# Patient Record
Sex: MALE | Race: WHITE | NOT HISPANIC OR LATINO | Employment: OTHER
[De-identification: names, ages, dates, MRNs, and addresses within clinical notes are randomized per-mention and may not be internally consistent; named-entity substitution may affect disease eponyms.]

---

## 2020-07-30 ENCOUNTER — TRANSCRIBE ORDERS (OUTPATIENT)
Dept: OTHER | Age: 80
End: 2020-07-30

## 2020-07-30 DIAGNOSIS — Q27.30 AVM (ARTERIOVENOUS MALFORMATION): Primary | ICD-10-CM

## 2020-08-04 ENCOUNTER — TELEPHONE (OUTPATIENT)
Dept: RADIOLOGY | Facility: CLINIC | Age: 80
End: 2020-08-04

## 2020-08-04 NOTE — TELEPHONE ENCOUNTER
I called daughter Maria Elena's phone number, inquiring about referral for [facility].  She stated she told Dr Lawrence's office she was not driving down to the [facility].  I thanked her and clarified I would close out this referral as not needed.  LISETTE Manzo, RN, BSN  Interventional Radiology Nurse Coordinator   Phone:  [phone]

## 2022-01-01 ENCOUNTER — APPOINTMENT (OUTPATIENT)
Dept: SPEECH THERAPY | Facility: CLINIC | Age: 82
DRG: 154 | End: 2022-01-01
Payer: COMMERCIAL

## 2022-01-01 ENCOUNTER — DOCUMENTATION ONLY (OUTPATIENT)
Dept: OTOLARYNGOLOGY | Facility: CLINIC | Age: 82
End: 2022-01-01

## 2022-01-01 ENCOUNTER — APPOINTMENT (OUTPATIENT)
Dept: GENERAL RADIOLOGY | Facility: CLINIC | Age: 82
DRG: 154 | End: 2022-01-01
Payer: COMMERCIAL

## 2022-01-01 ENCOUNTER — APPOINTMENT (OUTPATIENT)
Dept: PHYSICAL THERAPY | Facility: CLINIC | Age: 82
DRG: 154 | End: 2022-01-01
Payer: COMMERCIAL

## 2022-01-01 ENCOUNTER — APPOINTMENT (OUTPATIENT)
Dept: OCCUPATIONAL THERAPY | Facility: CLINIC | Age: 82
DRG: 154 | End: 2022-01-01
Payer: COMMERCIAL

## 2022-01-01 ENCOUNTER — APPOINTMENT (OUTPATIENT)
Dept: CT IMAGING | Facility: CLINIC | Age: 82
DRG: 154 | End: 2022-01-01
Payer: COMMERCIAL

## 2022-01-01 ENCOUNTER — ANESTHESIA (OUTPATIENT)
Dept: INTENSIVE CARE | Facility: CLINIC | Age: 82
DRG: 154 | End: 2022-01-01
Payer: COMMERCIAL

## 2022-01-01 ENCOUNTER — TELEPHONE (OUTPATIENT)
Dept: OTOLARYNGOLOGY | Facility: CLINIC | Age: 82
End: 2022-01-01

## 2022-01-01 ENCOUNTER — PATIENT OUTREACH (OUTPATIENT)
Dept: CARE COORDINATION | Facility: CLINIC | Age: 82
End: 2022-01-01

## 2022-01-01 ENCOUNTER — ANESTHESIA EVENT (OUTPATIENT)
Dept: INTENSIVE CARE | Facility: CLINIC | Age: 82
DRG: 154 | End: 2022-01-01
Payer: COMMERCIAL

## 2022-01-01 ENCOUNTER — HOSPITAL ENCOUNTER (INPATIENT)
Facility: CLINIC | Age: 82
LOS: 18 days | Discharge: NURSING, REHAB OR LONG-TERM CARE | DRG: 154 | End: 2022-10-27
Admitting: SURGERY
Payer: COMMERCIAL

## 2022-01-01 ENCOUNTER — APPOINTMENT (OUTPATIENT)
Dept: ULTRASOUND IMAGING | Facility: CLINIC | Age: 82
DRG: 154 | End: 2022-01-01
Payer: COMMERCIAL

## 2022-01-01 VITALS
TEMPERATURE: 97.5 F | SYSTOLIC BLOOD PRESSURE: 109 MMHG | DIASTOLIC BLOOD PRESSURE: 58 MMHG | BODY MASS INDEX: 34.17 KG/M2 | HEIGHT: 71 IN | HEART RATE: 71 BPM | RESPIRATION RATE: 18 BRPM | OXYGEN SATURATION: 96 % | WEIGHT: 244.05 LBS

## 2022-01-01 DIAGNOSIS — E04.9 GOITER: ICD-10-CM

## 2022-01-01 DIAGNOSIS — J38.3 VOCAL CORD GRANULOMA: Primary | ICD-10-CM

## 2022-01-01 DIAGNOSIS — R06.81 APNEA: ICD-10-CM

## 2022-01-01 DIAGNOSIS — G47.33 OSA (OBSTRUCTIVE SLEEP APNEA): ICD-10-CM

## 2022-01-01 LAB
ABO/RH(D): NORMAL
ABO/RH(D): NORMAL
ALBUMIN SERPL BCG-MCNC: 3 G/DL (ref 3.5–5.2)
ALBUMIN SERPL BCG-MCNC: 3.3 G/DL (ref 3.5–5.2)
ALP SERPL-CCNC: 101 U/L (ref 40–129)
ALP SERPL-CCNC: 90 U/L (ref 40–129)
ALT SERPL W P-5'-P-CCNC: 9 U/L (ref 10–50)
ALT SERPL W P-5'-P-CCNC: <5 U/L (ref 10–50)
AMYLASE SERPL-CCNC: 62 U/L (ref 28–100)
ANION GAP SERPL CALCULATED.3IONS-SCNC: 10 MMOL/L (ref 7–15)
ANION GAP SERPL CALCULATED.3IONS-SCNC: 7 MMOL/L (ref 7–15)
ANION GAP SERPL CALCULATED.3IONS-SCNC: 8 MMOL/L (ref 7–15)
ANION GAP SERPL CALCULATED.3IONS-SCNC: 8 MMOL/L (ref 7–15)
ANION GAP SERPL CALCULATED.3IONS-SCNC: 9 MMOL/L (ref 7–15)
ANTIBODY SCREEN: NEGATIVE
APTT PPP: 33 SECONDS (ref 22–38)
APTT PPP: 35 SECONDS (ref 22–38)
AST SERPL W P-5'-P-CCNC: 17 U/L (ref 10–50)
AST SERPL W P-5'-P-CCNC: 18 U/L (ref 10–50)
ATRIAL RATE - MUSE: 56 BPM
ATRIAL RATE - MUSE: 75 BPM
BACTERIA SPT CULT: NORMAL
BACTERIA SPT CULT: NORMAL
BASE EXCESS BLDA CALC-SCNC: 7.8 MMOL/L (ref -9–1.8)
BASE EXCESS BLDV CALC-SCNC: 0.8 MMOL/L (ref -7.7–1.9)
BASE EXCESS BLDV CALC-SCNC: 2.6 MMOL/L (ref -7.7–1.9)
BASE EXCESS BLDV CALC-SCNC: 2.8 MMOL/L (ref -7.7–1.9)
BASE EXCESS BLDV CALC-SCNC: 3.9 MMOL/L (ref -7.7–1.9)
BASE EXCESS BLDV CALC-SCNC: 6.1 MMOL/L (ref -7.7–1.9)
BASE EXCESS BLDV CALC-SCNC: 6.1 MMOL/L (ref -7.7–1.9)
BASE EXCESS BLDV CALC-SCNC: 6.2 MMOL/L (ref -7.7–1.9)
BASOPHILS # BLD AUTO: 0 10E3/UL (ref 0–0.2)
BASOPHILS NFR BLD AUTO: 0 %
BILIRUB SERPL-MCNC: 0.4 MG/DL
BILIRUB SERPL-MCNC: 0.5 MG/DL
BUN SERPL-MCNC: 13.2 MG/DL (ref 8–23)
BUN SERPL-MCNC: 13.7 MG/DL (ref 8–23)
BUN SERPL-MCNC: 15.9 MG/DL (ref 8–23)
BUN SERPL-MCNC: 19.4 MG/DL (ref 8–23)
BUN SERPL-MCNC: 22.8 MG/DL (ref 8–23)
BUN SERPL-MCNC: 23.1 MG/DL (ref 8–23)
BUN SERPL-MCNC: 24.7 MG/DL (ref 8–23)
CA-I BLD-MCNC: 4.5 MG/DL (ref 4.4–5.2)
CALCIUM SERPL-MCNC: 7.9 MG/DL (ref 8.8–10.2)
CALCIUM SERPL-MCNC: 7.9 MG/DL (ref 8.8–10.2)
CALCIUM SERPL-MCNC: 8.2 MG/DL (ref 8.8–10.2)
CALCIUM SERPL-MCNC: 8.3 MG/DL (ref 8.8–10.2)
CALCIUM SERPL-MCNC: 8.3 MG/DL (ref 8.8–10.2)
CALCIUM SERPL-MCNC: 8.4 MG/DL (ref 8.8–10.2)
CALCIUM SERPL-MCNC: 8.7 MG/DL (ref 8.8–10.2)
CHLORIDE SERPL-SCNC: 100 MMOL/L (ref 98–107)
CHLORIDE SERPL-SCNC: 103 MMOL/L (ref 98–107)
CHLORIDE SERPL-SCNC: 104 MMOL/L (ref 98–107)
CHLORIDE SERPL-SCNC: 105 MMOL/L (ref 98–107)
CHLORIDE SERPL-SCNC: 107 MMOL/L (ref 98–107)
CREAT SERPL-MCNC: 1.03 MG/DL (ref 0.67–1.17)
CREAT SERPL-MCNC: 1.16 MG/DL (ref 0.67–1.17)
CREAT SERPL-MCNC: 1.18 MG/DL (ref 0.67–1.17)
CREAT SERPL-MCNC: 1.21 MG/DL (ref 0.67–1.17)
CREAT SERPL-MCNC: 1.22 MG/DL (ref 0.67–1.17)
CREAT SERPL-MCNC: 1.28 MG/DL (ref 0.67–1.17)
CREAT SERPL-MCNC: 1.33 MG/DL (ref 0.67–1.17)
DEPRECATED HCO3 PLAS-SCNC: 22 MMOL/L (ref 22–29)
DEPRECATED HCO3 PLAS-SCNC: 25 MMOL/L (ref 22–29)
DEPRECATED HCO3 PLAS-SCNC: 25 MMOL/L (ref 22–29)
DEPRECATED HCO3 PLAS-SCNC: 26 MMOL/L (ref 22–29)
DEPRECATED HCO3 PLAS-SCNC: 30 MMOL/L (ref 22–29)
DIASTOLIC BLOOD PRESSURE - MUSE: NORMAL MMHG
DIASTOLIC BLOOD PRESSURE - MUSE: NORMAL MMHG
EOSINOPHIL # BLD AUTO: 0 10E3/UL (ref 0–0.7)
EOSINOPHIL # BLD AUTO: 0 10E3/UL (ref 0–0.7)
EOSINOPHIL # BLD AUTO: 0.4 10E3/UL (ref 0–0.7)
EOSINOPHIL NFR BLD AUTO: 0 %
EOSINOPHIL NFR BLD AUTO: 0 %
EOSINOPHIL NFR BLD AUTO: 3 %
ERYTHROCYTE [DISTWIDTH] IN BLOOD BY AUTOMATED COUNT: 17.2 % (ref 10–15)
ERYTHROCYTE [DISTWIDTH] IN BLOOD BY AUTOMATED COUNT: 17.3 % (ref 10–15)
ERYTHROCYTE [DISTWIDTH] IN BLOOD BY AUTOMATED COUNT: 17.3 % (ref 10–15)
ERYTHROCYTE [DISTWIDTH] IN BLOOD BY AUTOMATED COUNT: 17.4 % (ref 10–15)
ERYTHROCYTE [DISTWIDTH] IN BLOOD BY AUTOMATED COUNT: 17.4 % (ref 10–15)
ERYTHROCYTE [DISTWIDTH] IN BLOOD BY AUTOMATED COUNT: 17.5 % (ref 10–15)
ERYTHROCYTE [DISTWIDTH] IN BLOOD BY AUTOMATED COUNT: 17.5 % (ref 10–15)
ERYTHROCYTE [DISTWIDTH] IN BLOOD BY AUTOMATED COUNT: 17.6 % (ref 10–15)
ERYTHROCYTE [DISTWIDTH] IN BLOOD BY AUTOMATED COUNT: 17.8 % (ref 10–15)
ERYTHROCYTE [DISTWIDTH] IN BLOOD BY AUTOMATED COUNT: 17.8 % (ref 10–15)
ERYTHROCYTE [DISTWIDTH] IN BLOOD BY AUTOMATED COUNT: 17.9 % (ref 10–15)
FIBRINOGEN PPP-MCNC: 624 MG/DL (ref 170–490)
FIBRINOGEN PPP-MCNC: 713 MG/DL (ref 170–490)
GFR SERPL CREATININE-BSD FRML MDRD: 54 ML/MIN/1.73M2
GFR SERPL CREATININE-BSD FRML MDRD: 56 ML/MIN/1.73M2
GFR SERPL CREATININE-BSD FRML MDRD: 60 ML/MIN/1.73M2
GFR SERPL CREATININE-BSD FRML MDRD: 60 ML/MIN/1.73M2
GFR SERPL CREATININE-BSD FRML MDRD: 62 ML/MIN/1.73M2
GFR SERPL CREATININE-BSD FRML MDRD: 63 ML/MIN/1.73M2
GFR SERPL CREATININE-BSD FRML MDRD: 73 ML/MIN/1.73M2
GLUCOSE BLDC GLUCOMTR-MCNC: 100 MG/DL (ref 70–99)
GLUCOSE BLDC GLUCOMTR-MCNC: 101 MG/DL (ref 70–99)
GLUCOSE BLDC GLUCOMTR-MCNC: 103 MG/DL (ref 70–99)
GLUCOSE BLDC GLUCOMTR-MCNC: 104 MG/DL (ref 70–99)
GLUCOSE BLDC GLUCOMTR-MCNC: 107 MG/DL (ref 70–99)
GLUCOSE BLDC GLUCOMTR-MCNC: 110 MG/DL (ref 70–99)
GLUCOSE BLDC GLUCOMTR-MCNC: 111 MG/DL (ref 70–99)
GLUCOSE BLDC GLUCOMTR-MCNC: 115 MG/DL (ref 70–99)
GLUCOSE BLDC GLUCOMTR-MCNC: 117 MG/DL (ref 70–99)
GLUCOSE BLDC GLUCOMTR-MCNC: 117 MG/DL (ref 70–99)
GLUCOSE BLDC GLUCOMTR-MCNC: 118 MG/DL (ref 70–99)
GLUCOSE BLDC GLUCOMTR-MCNC: 119 MG/DL (ref 70–99)
GLUCOSE BLDC GLUCOMTR-MCNC: 121 MG/DL (ref 70–99)
GLUCOSE BLDC GLUCOMTR-MCNC: 123 MG/DL (ref 70–99)
GLUCOSE BLDC GLUCOMTR-MCNC: 123 MG/DL (ref 70–99)
GLUCOSE BLDC GLUCOMTR-MCNC: 125 MG/DL (ref 70–99)
GLUCOSE BLDC GLUCOMTR-MCNC: 127 MG/DL (ref 70–99)
GLUCOSE BLDC GLUCOMTR-MCNC: 128 MG/DL (ref 70–99)
GLUCOSE BLDC GLUCOMTR-MCNC: 128 MG/DL (ref 70–99)
GLUCOSE BLDC GLUCOMTR-MCNC: 129 MG/DL (ref 70–99)
GLUCOSE BLDC GLUCOMTR-MCNC: 130 MG/DL (ref 70–99)
GLUCOSE BLDC GLUCOMTR-MCNC: 131 MG/DL (ref 70–99)
GLUCOSE BLDC GLUCOMTR-MCNC: 132 MG/DL (ref 70–99)
GLUCOSE BLDC GLUCOMTR-MCNC: 137 MG/DL (ref 70–99)
GLUCOSE BLDC GLUCOMTR-MCNC: 137 MG/DL (ref 70–99)
GLUCOSE BLDC GLUCOMTR-MCNC: 139 MG/DL (ref 70–99)
GLUCOSE BLDC GLUCOMTR-MCNC: 139 MG/DL (ref 70–99)
GLUCOSE BLDC GLUCOMTR-MCNC: 140 MG/DL (ref 70–99)
GLUCOSE BLDC GLUCOMTR-MCNC: 141 MG/DL (ref 70–99)
GLUCOSE BLDC GLUCOMTR-MCNC: 142 MG/DL (ref 70–99)
GLUCOSE BLDC GLUCOMTR-MCNC: 144 MG/DL (ref 70–99)
GLUCOSE BLDC GLUCOMTR-MCNC: 147 MG/DL (ref 70–99)
GLUCOSE BLDC GLUCOMTR-MCNC: 150 MG/DL (ref 70–99)
GLUCOSE BLDC GLUCOMTR-MCNC: 151 MG/DL (ref 70–99)
GLUCOSE BLDC GLUCOMTR-MCNC: 151 MG/DL (ref 70–99)
GLUCOSE BLDC GLUCOMTR-MCNC: 152 MG/DL (ref 70–99)
GLUCOSE BLDC GLUCOMTR-MCNC: 153 MG/DL (ref 70–99)
GLUCOSE BLDC GLUCOMTR-MCNC: 157 MG/DL (ref 70–99)
GLUCOSE BLDC GLUCOMTR-MCNC: 158 MG/DL (ref 70–99)
GLUCOSE BLDC GLUCOMTR-MCNC: 159 MG/DL (ref 70–99)
GLUCOSE BLDC GLUCOMTR-MCNC: 159 MG/DL (ref 70–99)
GLUCOSE BLDC GLUCOMTR-MCNC: 163 MG/DL (ref 70–99)
GLUCOSE BLDC GLUCOMTR-MCNC: 163 MG/DL (ref 70–99)
GLUCOSE BLDC GLUCOMTR-MCNC: 164 MG/DL (ref 70–99)
GLUCOSE BLDC GLUCOMTR-MCNC: 181 MG/DL (ref 70–99)
GLUCOSE BLDC GLUCOMTR-MCNC: 188 MG/DL (ref 70–99)
GLUCOSE BLDC GLUCOMTR-MCNC: 189 MG/DL (ref 70–99)
GLUCOSE BLDC GLUCOMTR-MCNC: 191 MG/DL (ref 70–99)
GLUCOSE BLDC GLUCOMTR-MCNC: 192 MG/DL (ref 70–99)
GLUCOSE BLDC GLUCOMTR-MCNC: 209 MG/DL (ref 70–99)
GLUCOSE BLDC GLUCOMTR-MCNC: 82 MG/DL (ref 70–99)
GLUCOSE BLDC GLUCOMTR-MCNC: 83 MG/DL (ref 70–99)
GLUCOSE BLDC GLUCOMTR-MCNC: 89 MG/DL (ref 70–99)
GLUCOSE BLDC GLUCOMTR-MCNC: 90 MG/DL (ref 70–99)
GLUCOSE BLDC GLUCOMTR-MCNC: 90 MG/DL (ref 70–99)
GLUCOSE BLDC GLUCOMTR-MCNC: 92 MG/DL (ref 70–99)
GLUCOSE BLDC GLUCOMTR-MCNC: 96 MG/DL (ref 70–99)
GLUCOSE BLDC GLUCOMTR-MCNC: 98 MG/DL (ref 70–99)
GLUCOSE BLDC GLUCOMTR-MCNC: 99 MG/DL (ref 70–99)
GLUCOSE SERPL-MCNC: 125 MG/DL (ref 70–99)
GLUCOSE SERPL-MCNC: 128 MG/DL (ref 70–99)
GLUCOSE SERPL-MCNC: 133 MG/DL (ref 70–99)
GLUCOSE SERPL-MCNC: 144 MG/DL (ref 70–99)
GLUCOSE SERPL-MCNC: 150 MG/DL (ref 70–99)
GLUCOSE SERPL-MCNC: 156 MG/DL (ref 70–99)
GLUCOSE SERPL-MCNC: 74 MG/DL (ref 70–99)
GRAM STAIN RESULT: NORMAL
HCO3 BLD-SCNC: 32 MMOL/L (ref 21–28)
HCO3 BLDV-SCNC: 27 MMOL/L (ref 21–28)
HCO3 BLDV-SCNC: 30 MMOL/L (ref 21–28)
HCO3 BLDV-SCNC: 31 MMOL/L (ref 21–28)
HCO3 BLDV-SCNC: 33 MMOL/L (ref 21–28)
HCT VFR BLD AUTO: 32.1 % (ref 40–53)
HCT VFR BLD AUTO: 32.3 % (ref 40–53)
HCT VFR BLD AUTO: 36.3 % (ref 40–53)
HCT VFR BLD AUTO: 36.7 % (ref 40–53)
HCT VFR BLD AUTO: 37.2 % (ref 40–53)
HCT VFR BLD AUTO: 37.9 % (ref 40–53)
HCT VFR BLD AUTO: 38 % (ref 40–53)
HCT VFR BLD AUTO: 38.1 % (ref 40–53)
HCT VFR BLD AUTO: 38.6 % (ref 40–53)
HCT VFR BLD AUTO: 38.9 % (ref 40–53)
HCT VFR BLD AUTO: 39 % (ref 40–53)
HCT VFR BLD AUTO: 39.7 % (ref 40–53)
HCT VFR BLD AUTO: 40.3 % (ref 40–53)
HGB BLD-MCNC: 10 G/DL (ref 13.3–17.7)
HGB BLD-MCNC: 10.9 G/DL (ref 13.3–17.7)
HGB BLD-MCNC: 11 G/DL (ref 13.3–17.7)
HGB BLD-MCNC: 11.2 G/DL (ref 13.3–17.7)
HGB BLD-MCNC: 11.2 G/DL (ref 13.3–17.7)
HGB BLD-MCNC: 11.4 G/DL (ref 13.3–17.7)
HGB BLD-MCNC: 11.5 G/DL (ref 13.3–17.7)
HGB BLD-MCNC: 11.6 G/DL (ref 13.3–17.7)
HGB BLD-MCNC: 11.9 G/DL (ref 13.3–17.7)
HGB BLD-MCNC: 12.1 G/DL (ref 13.3–17.7)
HGB BLD-MCNC: 9.9 G/DL (ref 13.3–17.7)
HOLD SPECIMEN: NORMAL
IMM GRANULOCYTES # BLD: 0 10E3/UL
IMM GRANULOCYTES # BLD: 0.1 10E3/UL
IMM GRANULOCYTES # BLD: 0.1 10E3/UL
IMM GRANULOCYTES NFR BLD: 0 %
IMM GRANULOCYTES NFR BLD: 1 %
IMM GRANULOCYTES NFR BLD: 1 %
INR PPP: 1.23 (ref 0.85–1.15)
INR PPP: 1.45 (ref 0.85–1.15)
INTERPRETATION ECG - MUSE: NORMAL
INTERPRETATION ECG - MUSE: NORMAL
LACTATE SERPL-SCNC: 1.8 MMOL/L (ref 0.7–2)
LACTATE SERPL-SCNC: 2.5 MMOL/L (ref 0.7–2)
LACTATE SERPL-SCNC: 3.3 MMOL/L (ref 0.7–2)
LIPASE SERPL-CCNC: 35 U/L (ref 13–60)
LYMPHOCYTES # BLD AUTO: 0.4 10E3/UL (ref 0.8–5.3)
LYMPHOCYTES # BLD AUTO: 0.6 10E3/UL (ref 0.8–5.3)
LYMPHOCYTES # BLD AUTO: 0.9 10E3/UL (ref 0.8–5.3)
LYMPHOCYTES NFR BLD AUTO: 5 %
LYMPHOCYTES NFR BLD AUTO: 6 %
LYMPHOCYTES NFR BLD AUTO: 8 %
MAGNESIUM SERPL-MCNC: 1.9 MG/DL (ref 1.7–2.3)
MAGNESIUM SERPL-MCNC: 2 MG/DL (ref 1.7–2.3)
MAGNESIUM SERPL-MCNC: 2 MG/DL (ref 1.7–2.3)
MAGNESIUM SERPL-MCNC: 2.1 MG/DL (ref 1.7–2.3)
MAGNESIUM SERPL-MCNC: 2.2 MG/DL (ref 1.7–2.3)
MAGNESIUM SERPL-MCNC: 2.3 MG/DL (ref 1.7–2.3)
MCH RBC QN AUTO: 23.9 PG (ref 26.5–33)
MCH RBC QN AUTO: 24 PG (ref 26.5–33)
MCH RBC QN AUTO: 24 PG (ref 26.5–33)
MCH RBC QN AUTO: 24.1 PG (ref 26.5–33)
MCH RBC QN AUTO: 24.1 PG (ref 26.5–33)
MCH RBC QN AUTO: 24.2 PG (ref 26.5–33)
MCH RBC QN AUTO: 24.4 PG (ref 26.5–33)
MCH RBC QN AUTO: 24.9 PG (ref 26.5–33)
MCHC RBC AUTO-ENTMCNC: 29.5 G/DL (ref 31.5–36.5)
MCHC RBC AUTO-ENTMCNC: 29.5 G/DL (ref 31.5–36.5)
MCHC RBC AUTO-ENTMCNC: 29.6 G/DL (ref 31.5–36.5)
MCHC RBC AUTO-ENTMCNC: 29.7 G/DL (ref 31.5–36.5)
MCHC RBC AUTO-ENTMCNC: 29.9 G/DL (ref 31.5–36.5)
MCHC RBC AUTO-ENTMCNC: 30 G/DL (ref 31.5–36.5)
MCHC RBC AUTO-ENTMCNC: 30.1 G/DL (ref 31.5–36.5)
MCHC RBC AUTO-ENTMCNC: 30.1 G/DL (ref 31.5–36.5)
MCHC RBC AUTO-ENTMCNC: 30.7 G/DL (ref 31.5–36.5)
MCHC RBC AUTO-ENTMCNC: 31.2 G/DL (ref 31.5–36.5)
MCV RBC AUTO: 78 FL (ref 78–100)
MCV RBC AUTO: 79 FL (ref 78–100)
MCV RBC AUTO: 80 FL (ref 78–100)
MCV RBC AUTO: 80 FL (ref 78–100)
MCV RBC AUTO: 81 FL (ref 78–100)
MCV RBC AUTO: 82 FL (ref 78–100)
MCV RBC AUTO: 83 FL (ref 78–100)
MONOCYTES # BLD AUTO: 0 10E3/UL (ref 0–1.3)
MONOCYTES # BLD AUTO: 0.3 10E3/UL (ref 0–1.3)
MONOCYTES # BLD AUTO: 0.8 10E3/UL (ref 0–1.3)
MONOCYTES NFR BLD AUTO: 1 %
MONOCYTES NFR BLD AUTO: 3 %
MONOCYTES NFR BLD AUTO: 7 %
MRSA DNA SPEC QL NAA+PROBE: NEGATIVE
NEUTROPHILS # BLD AUTO: 7.8 10E3/UL (ref 1.6–8.3)
NEUTROPHILS # BLD AUTO: 8.3 10E3/UL (ref 1.6–8.3)
NEUTROPHILS # BLD AUTO: 8.9 10E3/UL (ref 1.6–8.3)
NEUTROPHILS NFR BLD AUTO: 81 %
NEUTROPHILS NFR BLD AUTO: 90 %
NEUTROPHILS NFR BLD AUTO: 94 %
NRBC # BLD AUTO: 0 10E3/UL
NRBC BLD AUTO-RTO: 0 /100
O2/TOTAL GAS SETTING VFR VENT: 2 %
O2/TOTAL GAS SETTING VFR VENT: 21 %
O2/TOTAL GAS SETTING VFR VENT: 21 %
O2/TOTAL GAS SETTING VFR VENT: 40 %
O2/TOTAL GAS SETTING VFR VENT: 50 %
O2/TOTAL GAS SETTING VFR VENT: 50 %
OXYHGB MFR BLDV: 29 % (ref 70–75)
P AXIS - MUSE: 40 DEGREES
P AXIS - MUSE: 86 DEGREES
PCO2 BLD: 41 MM HG (ref 35–45)
PCO2 BLDV: 39 MM HG (ref 40–50)
PCO2 BLDV: 40 MM HG (ref 40–50)
PCO2 BLDV: 47 MM HG (ref 40–50)
PCO2 BLDV: 50 MM HG (ref 40–50)
PCO2 BLDV: 54 MM HG (ref 40–50)
PCO2 BLDV: 58 MM HG (ref 40–50)
PCO2 BLDV: 71 MM HG (ref 40–50)
PH BLD: 7.5 [PH] (ref 7.35–7.45)
PH BLDV: 7.24 [PH] (ref 7.32–7.43)
PH BLDV: 7.32 [PH] (ref 7.32–7.43)
PH BLDV: 7.38 [PH] (ref 7.32–7.43)
PH BLDV: 7.39 [PH] (ref 7.32–7.43)
PH BLDV: 7.43 [PH] (ref 7.32–7.43)
PH BLDV: 7.44 [PH] (ref 7.32–7.43)
PH BLDV: 7.49 [PH] (ref 7.32–7.43)
PHOSPHATE SERPL-MCNC: 2.4 MG/DL (ref 2.5–4.5)
PHOSPHATE SERPL-MCNC: 2.6 MG/DL (ref 2.5–4.5)
PHOSPHATE SERPL-MCNC: 2.6 MG/DL (ref 2.5–4.5)
PHOSPHATE SERPL-MCNC: 2.7 MG/DL (ref 2.5–4.5)
PHOSPHATE SERPL-MCNC: 2.8 MG/DL (ref 2.5–4.5)
PHOSPHATE SERPL-MCNC: 3 MG/DL (ref 2.5–4.5)
PHOSPHATE SERPL-MCNC: 3 MG/DL (ref 2.5–4.5)
PHOSPHATE SERPL-MCNC: 3.2 MG/DL (ref 2.5–4.5)
PHOSPHATE SERPL-MCNC: 3.4 MG/DL (ref 2.5–4.5)
PLATELET # BLD AUTO: 188 10E3/UL (ref 150–450)
PLATELET # BLD AUTO: 217 10E3/UL (ref 150–450)
PLATELET # BLD AUTO: 235 10E3/UL (ref 150–450)
PLATELET # BLD AUTO: 268 10E3/UL (ref 150–450)
PLATELET # BLD AUTO: 276 10E3/UL (ref 150–450)
PLATELET # BLD AUTO: 277 10E3/UL (ref 150–450)
PLATELET # BLD AUTO: 279 10E3/UL (ref 150–450)
PLATELET # BLD AUTO: 280 10E3/UL (ref 150–450)
PLATELET # BLD AUTO: 282 10E3/UL (ref 150–450)
PLATELET # BLD AUTO: 295 10E3/UL (ref 150–450)
PLATELET # BLD AUTO: 302 10E3/UL (ref 150–450)
PLATELET # BLD AUTO: 320 10E3/UL (ref 150–450)
PLATELET # BLD AUTO: 323 10E3/UL (ref 150–450)
PO2 BLD: 77 MM HG (ref 80–105)
PO2 BLDV: 21 MM HG (ref 25–47)
PO2 BLDV: 39 MM HG (ref 25–47)
PO2 BLDV: 41 MM HG (ref 25–47)
PO2 BLDV: 50 MM HG (ref 25–47)
PO2 BLDV: 56 MM HG (ref 25–47)
PO2 BLDV: 57 MM HG (ref 25–47)
PO2 BLDV: 64 MM HG (ref 25–47)
POTASSIUM SERPL-SCNC: 3.6 MMOL/L (ref 3.4–5.3)
POTASSIUM SERPL-SCNC: 3.6 MMOL/L (ref 3.4–5.3)
POTASSIUM SERPL-SCNC: 4 MMOL/L (ref 3.4–5.3)
POTASSIUM SERPL-SCNC: 4.2 MMOL/L (ref 3.4–5.3)
POTASSIUM SERPL-SCNC: 4.3 MMOL/L (ref 3.4–5.3)
POTASSIUM SERPL-SCNC: 4.4 MMOL/L (ref 3.4–5.3)
POTASSIUM SERPL-SCNC: 4.4 MMOL/L (ref 3.4–5.3)
POTASSIUM SERPL-SCNC: 4.5 MMOL/L (ref 3.4–5.3)
POTASSIUM SERPL-SCNC: 4.6 MMOL/L (ref 3.4–5.3)
PR INTERVAL - MUSE: 168 MS
PR INTERVAL - MUSE: 206 MS
PROT SERPL-MCNC: 6.8 G/DL (ref 6.4–8.3)
PROT SERPL-MCNC: 6.9 G/DL (ref 6.4–8.3)
QRS DURATION - MUSE: 106 MS
QRS DURATION - MUSE: 108 MS
QT - MUSE: 430 MS
QT - MUSE: 462 MS
QTC - MUSE: 414 MS
QTC - MUSE: 515 MS
R AXIS - MUSE: -46 DEGREES
R AXIS - MUSE: -56 DEGREES
RBC # BLD AUTO: 4.09 10E6/UL (ref 4.4–5.9)
RBC # BLD AUTO: 4.13 10E6/UL (ref 4.4–5.9)
RBC # BLD AUTO: 4.46 10E6/UL (ref 4.4–5.9)
RBC # BLD AUTO: 4.55 10E6/UL (ref 4.4–5.9)
RBC # BLD AUTO: 4.65 10E6/UL (ref 4.4–5.9)
RBC # BLD AUTO: 4.65 10E6/UL (ref 4.4–5.9)
RBC # BLD AUTO: 4.68 10E6/UL (ref 4.4–5.9)
RBC # BLD AUTO: 4.71 10E6/UL (ref 4.4–5.9)
RBC # BLD AUTO: 4.72 10E6/UL (ref 4.4–5.9)
RBC # BLD AUTO: 4.8 10E6/UL (ref 4.4–5.9)
RBC # BLD AUTO: 4.85 10E6/UL (ref 4.4–5.9)
RBC # BLD AUTO: 4.86 10E6/UL (ref 4.4–5.9)
RBC # BLD AUTO: 4.87 10E6/UL (ref 4.4–5.9)
SA TARGET DNA: NEGATIVE
SARS-COV-2 RNA RESP QL NAA+PROBE: NEGATIVE
SODIUM SERPL-SCNC: 134 MMOL/L (ref 136–145)
SODIUM SERPL-SCNC: 136 MMOL/L (ref 136–145)
SODIUM SERPL-SCNC: 136 MMOL/L (ref 136–145)
SODIUM SERPL-SCNC: 137 MMOL/L (ref 136–145)
SODIUM SERPL-SCNC: 138 MMOL/L (ref 136–145)
SODIUM SERPL-SCNC: 139 MMOL/L (ref 136–145)
SODIUM SERPL-SCNC: 141 MMOL/L (ref 136–145)
SPECIMEN EXPIRATION DATE: NORMAL
SPECIMEN EXPIRATION DATE: NORMAL
SYSTOLIC BLOOD PRESSURE - MUSE: NORMAL MMHG
SYSTOLIC BLOOD PRESSURE - MUSE: NORMAL MMHG
T AXIS - MUSE: -35 DEGREES
T AXIS - MUSE: -7 DEGREES
T4 FREE SERPL-MCNC: 1.07 NG/DL (ref 0.9–1.7)
TSH SERPL DL<=0.005 MIU/L-ACNC: 0.28 UIU/ML (ref 0.3–4.2)
VENTRICULAR RATE- MUSE: 56 BPM
VENTRICULAR RATE- MUSE: 75 BPM
WBC # BLD AUTO: 10.3 10E3/UL (ref 4–11)
WBC # BLD AUTO: 10.5 10E3/UL (ref 4–11)
WBC # BLD AUTO: 10.8 10E3/UL (ref 4–11)
WBC # BLD AUTO: 11.1 10E3/UL (ref 4–11)
WBC # BLD AUTO: 11.5 10E3/UL (ref 4–11)
WBC # BLD AUTO: 13.3 10E3/UL (ref 4–11)
WBC # BLD AUTO: 8.3 10E3/UL (ref 4–11)
WBC # BLD AUTO: 8.9 10E3/UL (ref 4–11)
WBC # BLD AUTO: 9.5 10E3/UL (ref 4–11)
WBC # BLD AUTO: 9.8 10E3/UL (ref 4–11)

## 2022-01-01 PROCEDURE — 250N000013 HC RX MED GY IP 250 OP 250 PS 637: Performed by: PHYSICIAN ASSISTANT

## 2022-01-01 PROCEDURE — 83735 ASSAY OF MAGNESIUM: CPT

## 2022-01-01 PROCEDURE — 250N000013 HC RX MED GY IP 250 OP 250 PS 637: Performed by: OTOLARYNGOLOGY

## 2022-01-01 PROCEDURE — 85027 COMPLETE CBC AUTOMATED: CPT | Performed by: OTOLARYNGOLOGY

## 2022-01-01 PROCEDURE — G0463 HOSPITAL OUTPT CLINIC VISIT: HCPCS

## 2022-01-01 PROCEDURE — 85025 COMPLETE CBC W/AUTO DIFF WBC: CPT | Performed by: SURGERY

## 2022-01-01 PROCEDURE — 82310 ASSAY OF CALCIUM: CPT

## 2022-01-01 PROCEDURE — 36415 COLL VENOUS BLD VENIPUNCTURE: CPT | Performed by: STUDENT IN AN ORGANIZED HEALTH CARE EDUCATION/TRAINING PROGRAM

## 2022-01-01 PROCEDURE — 97161 PT EVAL LOW COMPLEX 20 MIN: CPT | Mod: GP

## 2022-01-01 PROCEDURE — 84100 ASSAY OF PHOSPHORUS: CPT

## 2022-01-01 PROCEDURE — 97530 THERAPEUTIC ACTIVITIES: CPT | Mod: GO

## 2022-01-01 PROCEDURE — 80048 BASIC METABOLIC PNL TOTAL CA: CPT

## 2022-01-01 PROCEDURE — 82803 BLOOD GASES ANY COMBINATION: CPT

## 2022-01-01 PROCEDURE — 74018 RADEX ABDOMEN 1 VIEW: CPT | Mod: 26 | Performed by: RADIOLOGY

## 2022-01-01 PROCEDURE — 200N000002 HC R&B ICU UMMC

## 2022-01-01 PROCEDURE — 97535 SELF CARE MNGMENT TRAINING: CPT | Mod: GO

## 2022-01-01 PROCEDURE — 92610 EVALUATE SWALLOWING FUNCTION: CPT | Mod: GN | Performed by: SPEECH-LANGUAGE PATHOLOGIST

## 2022-01-01 PROCEDURE — 36415 COLL VENOUS BLD VENIPUNCTURE: CPT | Performed by: OTOLARYNGOLOGY

## 2022-01-01 PROCEDURE — 999N000253 HC STATISTIC WEANING TRIALS

## 2022-01-01 PROCEDURE — 93010 ELECTROCARDIOGRAM REPORT: CPT | Performed by: INTERNAL MEDICINE

## 2022-01-01 PROCEDURE — 85730 THROMBOPLASTIN TIME PARTIAL: CPT

## 2022-01-01 PROCEDURE — 82805 BLOOD GASES W/O2 SATURATION: CPT

## 2022-01-01 PROCEDURE — 97535 SELF CARE MNGMENT TRAINING: CPT | Mod: GO | Performed by: OCCUPATIONAL THERAPIST

## 2022-01-01 PROCEDURE — 99291 CRITICAL CARE FIRST HOUR: CPT | Mod: GC | Performed by: SURGERY

## 2022-01-01 PROCEDURE — 250N000013 HC RX MED GY IP 250 OP 250 PS 637

## 2022-01-01 PROCEDURE — 36415 COLL VENOUS BLD VENIPUNCTURE: CPT | Performed by: SURGERY

## 2022-01-01 PROCEDURE — 258N000003 HC RX IP 258 OP 636: Performed by: STUDENT IN AN ORGANIZED HEALTH CARE EDUCATION/TRAINING PROGRAM

## 2022-01-01 PROCEDURE — 71045 X-RAY EXAM CHEST 1 VIEW: CPT | Mod: 26 | Performed by: RADIOLOGY

## 2022-01-01 PROCEDURE — 94640 AIRWAY INHALATION TREATMENT: CPT

## 2022-01-01 PROCEDURE — 82803 BLOOD GASES ANY COMBINATION: CPT | Performed by: SURGERY

## 2022-01-01 PROCEDURE — 999N000157 HC STATISTIC RCP TIME EA 10 MIN

## 2022-01-01 PROCEDURE — 85027 COMPLETE CBC AUTOMATED: CPT

## 2022-01-01 PROCEDURE — 70491 CT SOFT TISSUE NECK W/DYE: CPT

## 2022-01-01 PROCEDURE — 94002 VENT MGMT INPAT INIT DAY: CPT

## 2022-01-01 PROCEDURE — 250N000011 HC RX IP 250 OP 636

## 2022-01-01 PROCEDURE — 250N000009 HC RX 250: Performed by: NURSE PRACTITIONER

## 2022-01-01 PROCEDURE — 250N000011 HC RX IP 250 OP 636: Performed by: STUDENT IN AN ORGANIZED HEALTH CARE EDUCATION/TRAINING PROGRAM

## 2022-01-01 PROCEDURE — C9113 INJ PANTOPRAZOLE SODIUM, VIA: HCPCS

## 2022-01-01 PROCEDURE — 97530 THERAPEUTIC ACTIVITIES: CPT | Mod: GO | Performed by: OCCUPATIONAL THERAPIST

## 2022-01-01 PROCEDURE — 82330 ASSAY OF CALCIUM: CPT

## 2022-01-01 PROCEDURE — 86850 RBC ANTIBODY SCREEN: CPT

## 2022-01-01 PROCEDURE — 36569 INSJ PICC 5 YR+ W/O IMAGING: CPT

## 2022-01-01 PROCEDURE — 92526 ORAL FUNCTION THERAPY: CPT | Mod: GN | Performed by: SPEECH-LANGUAGE PATHOLOGIST

## 2022-01-01 PROCEDURE — 31575 DIAGNOSTIC LARYNGOSCOPY: CPT | Performed by: PHYSICIAN ASSISTANT

## 2022-01-01 PROCEDURE — 5A1945Z RESPIRATORY VENTILATION, 24-96 CONSECUTIVE HOURS: ICD-10-PCS | Performed by: SURGERY

## 2022-01-01 PROCEDURE — 5A1945Z RESPIRATORY VENTILATION, 24-96 CONSECUTIVE HOURS: ICD-10-PCS | Performed by: OTOLARYNGOLOGY

## 2022-01-01 PROCEDURE — 85018 HEMOGLOBIN: CPT | Performed by: OTOLARYNGOLOGY

## 2022-01-01 PROCEDURE — 36415 COLL VENOUS BLD VENIPUNCTURE: CPT

## 2022-01-01 PROCEDURE — 258N000003 HC RX IP 258 OP 636

## 2022-01-01 PROCEDURE — 80053 COMPREHEN METABOLIC PANEL: CPT

## 2022-01-01 PROCEDURE — 94003 VENT MGMT INPAT SUBQ DAY: CPT

## 2022-01-01 PROCEDURE — 87641 MR-STAPH DNA AMP PROBE: CPT | Performed by: STUDENT IN AN ORGANIZED HEALTH CARE EDUCATION/TRAINING PROGRAM

## 2022-01-01 PROCEDURE — 87205 SMEAR GRAM STAIN: CPT

## 2022-01-01 PROCEDURE — 999N000007 HC SITE CHECK

## 2022-01-01 PROCEDURE — 999N000128 HC STATISTIC PERIPHERAL IV START W/O US GUIDANCE

## 2022-01-01 PROCEDURE — 83605 ASSAY OF LACTIC ACID: CPT | Performed by: STUDENT IN AN ORGANIZED HEALTH CARE EDUCATION/TRAINING PROGRAM

## 2022-01-01 PROCEDURE — 999N000065 XR CHEST PORT 1 VIEW

## 2022-01-01 PROCEDURE — 120N000002 HC R&B MED SURG/OB UMMC

## 2022-01-01 PROCEDURE — 97165 OT EVAL LOW COMPLEX 30 MIN: CPT | Mod: GO

## 2022-01-01 PROCEDURE — 97530 THERAPEUTIC ACTIVITIES: CPT | Mod: GP

## 2022-01-01 PROCEDURE — 999N000127 HC STATISTIC PERIPHERAL IV START W US GUIDANCE

## 2022-01-01 PROCEDURE — 250N000011 HC RX IP 250 OP 636: Performed by: SURGERY

## 2022-01-01 PROCEDURE — 0CJS8ZZ INSPECTION OF LARYNX, VIA NATURAL OR ARTIFICIAL OPENING ENDOSCOPIC: ICD-10-PCS | Performed by: OTOLARYNGOLOGY

## 2022-01-01 PROCEDURE — 120N000003 HC R&B IMCU UMMC

## 2022-01-01 PROCEDURE — 85384 FIBRINOGEN ACTIVITY: CPT

## 2022-01-01 PROCEDURE — 250N000012 HC RX MED GY IP 250 OP 636 PS 637

## 2022-01-01 PROCEDURE — 250N000009 HC RX 250

## 2022-01-01 PROCEDURE — 97530 THERAPEUTIC ACTIVITIES: CPT | Mod: GP | Performed by: REHABILITATION PRACTITIONER

## 2022-01-01 PROCEDURE — 85610 PROTHROMBIN TIME: CPT | Performed by: OTOLARYNGOLOGY

## 2022-01-01 PROCEDURE — 250N000013 HC RX MED GY IP 250 OP 250 PS 637: Performed by: STUDENT IN AN ORGANIZED HEALTH CARE EDUCATION/TRAINING PROGRAM

## 2022-01-01 PROCEDURE — U0005 INFEC AGEN DETEC AMPLI PROBE: HCPCS | Performed by: STUDENT IN AN ORGANIZED HEALTH CARE EDUCATION/TRAINING PROGRAM

## 2022-01-01 PROCEDURE — 999N000259 HC STATISTIC EXTUBATION

## 2022-01-01 PROCEDURE — 85041 AUTOMATED RBC COUNT: CPT | Performed by: OTOLARYNGOLOGY

## 2022-01-01 PROCEDURE — 99291 CRITICAL CARE FIRST HOUR: CPT

## 2022-01-01 PROCEDURE — 93005 ELECTROCARDIOGRAM TRACING: CPT

## 2022-01-01 PROCEDURE — 84132 ASSAY OF SERUM POTASSIUM: CPT | Performed by: OTOLARYNGOLOGY

## 2022-01-01 PROCEDURE — 999N000111 HC STATISTIC OT IP EVAL DEFER: Performed by: OCCUPATIONAL THERAPIST

## 2022-01-01 PROCEDURE — 76536 US EXAM OF HEAD AND NECK: CPT

## 2022-01-01 PROCEDURE — 999N000065 XR ABDOMEN PORT 1 VIEW

## 2022-01-01 PROCEDURE — 370N000003 HC ANESTHESIA WARD SERVICE

## 2022-01-01 PROCEDURE — 97116 GAIT TRAINING THERAPY: CPT | Mod: GP | Performed by: REHABILITATION PRACTITIONER

## 2022-01-01 PROCEDURE — A7035 POS AIRWAY PRESS HEADGEAR: HCPCS

## 2022-01-01 PROCEDURE — 92526 ORAL FUNCTION THERAPY: CPT | Mod: GN

## 2022-01-01 PROCEDURE — 97116 GAIT TRAINING THERAPY: CPT | Mod: GP

## 2022-01-01 PROCEDURE — 80048 BASIC METABOLIC PNL TOTAL CA: CPT | Performed by: SURGERY

## 2022-01-01 PROCEDURE — 83605 ASSAY OF LACTIC ACID: CPT

## 2022-01-01 PROCEDURE — 99207 PR NO CHARGE LOS: CPT | Performed by: OTOLARYNGOLOGY

## 2022-01-01 PROCEDURE — 74018 RADEX ABDOMEN 1 VIEW: CPT

## 2022-01-01 PROCEDURE — 85610 PROTHROMBIN TIME: CPT

## 2022-01-01 PROCEDURE — 84443 ASSAY THYROID STIM HORMONE: CPT

## 2022-01-01 PROCEDURE — 71260 CT THORAX DX C+: CPT | Mod: 26 | Performed by: RADIOLOGY

## 2022-01-01 PROCEDURE — 76536 US EXAM OF HEAD AND NECK: CPT | Mod: 26 | Performed by: RADIOLOGY

## 2022-01-01 PROCEDURE — 70491 CT SOFT TISSUE NECK W/DYE: CPT | Mod: 26 | Performed by: STUDENT IN AN ORGANIZED HEALTH CARE EDUCATION/TRAINING PROGRAM

## 2022-01-01 PROCEDURE — 83690 ASSAY OF LIPASE: CPT

## 2022-01-01 PROCEDURE — 99207 PR NO BILLABLE SERVICE THIS VISIT: CPT | Performed by: SURGERY

## 2022-01-01 PROCEDURE — 83735 ASSAY OF MAGNESIUM: CPT | Performed by: SURGERY

## 2022-01-01 PROCEDURE — 250N000009 HC RX 250: Performed by: STUDENT IN AN ORGANIZED HEALTH CARE EDUCATION/TRAINING PROGRAM

## 2022-01-01 PROCEDURE — 85384 FIBRINOGEN ACTIVITY: CPT | Performed by: OTOLARYNGOLOGY

## 2022-01-01 PROCEDURE — 71260 CT THORAX DX C+: CPT

## 2022-01-01 PROCEDURE — 71045 X-RAY EXAM CHEST 1 VIEW: CPT

## 2022-01-01 PROCEDURE — 84439 ASSAY OF FREE THYROXINE: CPT

## 2022-01-01 PROCEDURE — 999N000185 HC STATISTIC TRANSPORT TIME EA 15 MIN

## 2022-01-01 PROCEDURE — 258N000003 HC RX IP 258 OP 636: Performed by: SURGERY

## 2022-01-01 PROCEDURE — 86901 BLOOD TYPING SEROLOGIC RH(D): CPT

## 2022-01-01 PROCEDURE — 85004 AUTOMATED DIFF WBC COUNT: CPT | Performed by: SURGERY

## 2022-01-01 PROCEDURE — 84132 ASSAY OF SERUM POTASSIUM: CPT | Performed by: NURSE PRACTITIONER

## 2022-01-01 PROCEDURE — 36600 WITHDRAWAL OF ARTERIAL BLOOD: CPT

## 2022-01-01 PROCEDURE — 250N000011 HC RX IP 250 OP 636: Performed by: OTOLARYNGOLOGY

## 2022-01-01 PROCEDURE — 120N000005 HC R&B MS OVERFLOW UMMC

## 2022-01-01 PROCEDURE — 80053 COMPREHEN METABOLIC PANEL: CPT | Performed by: SURGERY

## 2022-01-01 PROCEDURE — 82150 ASSAY OF AMYLASE: CPT

## 2022-01-01 PROCEDURE — 84100 ASSAY OF PHOSPHORUS: CPT | Performed by: OTOLARYNGOLOGY

## 2022-01-01 RX ORDER — MULTIPLE VITAMINS W/ MINERALS TAB 9MG-400MCG
1 TAB ORAL DAILY
Status: DISCONTINUED | OUTPATIENT
Start: 2022-01-01 | End: 2022-01-01 | Stop reason: HOSPADM

## 2022-01-01 RX ORDER — POLYETHYLENE GLYCOL 3350 17 G/17G
17 POWDER, FOR SOLUTION ORAL DAILY
Status: DISCONTINUED | OUTPATIENT
Start: 2022-01-01 | End: 2022-01-01

## 2022-01-01 RX ORDER — DEXMEDETOMIDINE HYDROCHLORIDE 4 UG/ML
.1-1.2 INJECTION, SOLUTION INTRAVENOUS CONTINUOUS
Status: DISCONTINUED | OUTPATIENT
Start: 2022-01-01 | End: 2022-01-01

## 2022-01-01 RX ORDER — PANTOPRAZOLE SODIUM 40 MG/1
40 TABLET, DELAYED RELEASE ORAL
Qty: 30 TABLET | Refills: 1 | Status: SHIPPED | OUTPATIENT
Start: 2022-01-01 | End: 2022-01-01

## 2022-01-01 RX ORDER — DEXAMETHASONE SODIUM PHOSPHATE 4 MG/ML
8 INJECTION, SOLUTION INTRA-ARTICULAR; INTRALESIONAL; INTRAMUSCULAR; INTRAVENOUS; SOFT TISSUE EVERY 8 HOURS
Status: COMPLETED | OUTPATIENT
Start: 2022-01-01 | End: 2022-01-01

## 2022-01-01 RX ORDER — FLUTICASONE PROPIONATE 220 UG/1
1 AEROSOL, METERED RESPIRATORY (INHALATION) EVERY 12 HOURS
Status: DISCONTINUED | OUTPATIENT
Start: 2022-01-01 | End: 2022-01-01 | Stop reason: HOSPADM

## 2022-01-01 RX ORDER — FUROSEMIDE 20 MG
20 TABLET ORAL EVERY MORNING
Status: DISCONTINUED | OUTPATIENT
Start: 2022-01-01 | End: 2022-01-01 | Stop reason: HOSPADM

## 2022-01-01 RX ORDER — POTASSIUM CHLORIDE 7.45 MG/ML
10 INJECTION INTRAVENOUS
Status: COMPLETED | OUTPATIENT
Start: 2022-01-01 | End: 2022-01-01

## 2022-01-01 RX ORDER — OXYCODONE HYDROCHLORIDE 5 MG/1
5 TABLET ORAL EVERY 6 HOURS PRN
Status: DISCONTINUED | OUTPATIENT
Start: 2022-01-01 | End: 2022-01-01

## 2022-01-01 RX ORDER — PROPOFOL 10 MG/ML
5-75 INJECTION, EMULSION INTRAVENOUS CONTINUOUS
Status: DISCONTINUED | OUTPATIENT
Start: 2022-01-01 | End: 2022-01-01

## 2022-01-01 RX ORDER — NALOXONE HYDROCHLORIDE 0.4 MG/ML
0.4 INJECTION, SOLUTION INTRAMUSCULAR; INTRAVENOUS; SUBCUTANEOUS
Status: DISCONTINUED | OUTPATIENT
Start: 2022-01-01 | End: 2022-01-01 | Stop reason: HOSPADM

## 2022-01-01 RX ORDER — AMOXICILLIN 250 MG
1 CAPSULE ORAL AT BEDTIME
Status: DISCONTINUED | OUTPATIENT
Start: 2022-01-01 | End: 2022-01-01

## 2022-01-01 RX ORDER — MULTIPLE VITAMINS W/ MINERALS TAB 9MG-400MCG
1 TAB ORAL DAILY
Status: DISCONTINUED | OUTPATIENT
Start: 2022-01-01 | End: 2022-01-01

## 2022-01-01 RX ORDER — SODIUM CHLORIDE, SODIUM LACTATE, POTASSIUM CHLORIDE, CALCIUM CHLORIDE 600; 310; 30; 20 MG/100ML; MG/100ML; MG/100ML; MG/100ML
INJECTION, SOLUTION INTRAVENOUS CONTINUOUS
Status: DISCONTINUED | OUTPATIENT
Start: 2022-01-01 | End: 2022-01-01

## 2022-01-01 RX ORDER — ACETAMINOPHEN 325 MG/1
650 TABLET ORAL EVERY 4 HOURS PRN
Status: DISCONTINUED | OUTPATIENT
Start: 2022-01-01 | End: 2022-01-01 | Stop reason: HOSPADM

## 2022-01-01 RX ORDER — NALOXONE HYDROCHLORIDE 0.4 MG/ML
0.2 INJECTION, SOLUTION INTRAMUSCULAR; INTRAVENOUS; SUBCUTANEOUS
Status: DISCONTINUED | OUTPATIENT
Start: 2022-01-01 | End: 2022-01-01 | Stop reason: HOSPADM

## 2022-01-01 RX ORDER — ATORVASTATIN CALCIUM 40 MG/1
40 TABLET, FILM COATED ORAL AT BEDTIME
Status: DISCONTINUED | OUTPATIENT
Start: 2022-01-01 | End: 2022-01-01 | Stop reason: HOSPADM

## 2022-01-01 RX ORDER — DEXTROSE MONOHYDRATE 100 MG/ML
INJECTION, SOLUTION INTRAVENOUS CONTINUOUS PRN
Status: DISCONTINUED | OUTPATIENT
Start: 2022-01-01 | End: 2022-01-01 | Stop reason: HOSPADM

## 2022-01-01 RX ORDER — HYDROMORPHONE HCL IN WATER/PF 6 MG/30 ML
.1-.4 PATIENT CONTROLLED ANALGESIA SYRINGE INTRAVENOUS
Status: DISCONTINUED | OUTPATIENT
Start: 2022-01-01 | End: 2022-01-01

## 2022-01-01 RX ORDER — ETOMIDATE 2 MG/ML
INJECTION INTRAVENOUS
Status: DISCONTINUED | OUTPATIENT
Start: 2022-01-01 | End: 2022-01-01

## 2022-01-01 RX ORDER — POLYETHYLENE GLYCOL 3350 17 G/17G
17 POWDER, FOR SOLUTION ORAL 2 TIMES DAILY PRN
Status: DISCONTINUED | OUTPATIENT
Start: 2022-01-01 | End: 2022-01-01

## 2022-01-01 RX ORDER — AMOXICILLIN 250 MG
1 CAPSULE ORAL
Status: DISCONTINUED | OUTPATIENT
Start: 2022-01-01 | End: 2022-01-01

## 2022-01-01 RX ORDER — ONDANSETRON 4 MG/1
4 TABLET, ORALLY DISINTEGRATING ORAL EVERY 6 HOURS PRN
Status: DISCONTINUED | OUTPATIENT
Start: 2022-01-01 | End: 2022-01-01 | Stop reason: HOSPADM

## 2022-01-01 RX ORDER — MULTIPLE VITAMINS W/ MINERALS TAB 9MG-400MCG
1 TAB ORAL DAILY
Qty: 30 TABLET | Refills: 0 | Status: SHIPPED | OUTPATIENT
Start: 2022-01-01

## 2022-01-01 RX ORDER — DEXAMETHASONE SODIUM PHOSPHATE 4 MG/ML
8 INJECTION, SOLUTION INTRA-ARTICULAR; INTRALESIONAL; INTRAMUSCULAR; INTRAVENOUS; SOFT TISSUE EVERY 8 HOURS
Status: DISCONTINUED | OUTPATIENT
Start: 2022-01-01 | End: 2022-01-01

## 2022-01-01 RX ORDER — IOPAMIDOL 755 MG/ML
100 INJECTION, SOLUTION INTRAVASCULAR ONCE
Status: COMPLETED | OUTPATIENT
Start: 2022-01-01 | End: 2022-01-01

## 2022-01-01 RX ORDER — FUROSEMIDE 20 MG
20 TABLET ORAL EVERY MORNING
COMMUNITY

## 2022-01-01 RX ORDER — DEXTROSE MONOHYDRATE 25 G/50ML
25-50 INJECTION, SOLUTION INTRAVENOUS
Status: DISCONTINUED | OUTPATIENT
Start: 2022-01-01 | End: 2022-01-01

## 2022-01-01 RX ORDER — PANTOPRAZOLE SODIUM 40 MG/1
40 TABLET, DELAYED RELEASE ORAL
Status: DISCONTINUED | OUTPATIENT
Start: 2022-01-01 | End: 2022-01-01

## 2022-01-01 RX ORDER — LIDOCAINE 40 MG/G
CREAM TOPICAL
Status: DISCONTINUED | OUTPATIENT
Start: 2022-01-01 | End: 2022-01-01 | Stop reason: HOSPADM

## 2022-01-01 RX ORDER — MULTIPLE VITAMINS W/ MINERALS TAB 9MG-400MCG
1 TAB ORAL DAILY
Qty: 30 TABLET | Refills: 0 | Status: SHIPPED | OUTPATIENT
Start: 2022-01-01 | End: 2022-01-01

## 2022-01-01 RX ORDER — PIPERACILLIN SODIUM, TAZOBACTAM SODIUM 3; .375 G/15ML; G/15ML
3.38 INJECTION, POWDER, LYOPHILIZED, FOR SOLUTION INTRAVENOUS EVERY 6 HOURS
Status: DISCONTINUED | OUTPATIENT
Start: 2022-01-01 | End: 2022-01-01

## 2022-01-01 RX ORDER — HEPARIN SODIUM 5000 [USP'U]/.5ML
5000 INJECTION, SOLUTION INTRAVENOUS; SUBCUTANEOUS EVERY 8 HOURS
Status: DISCONTINUED | OUTPATIENT
Start: 2022-01-01 | End: 2022-01-01

## 2022-01-01 RX ORDER — ALBUTEROL SULFATE 90 UG/1
2 AEROSOL, METERED RESPIRATORY (INHALATION) EVERY 6 HOURS PRN
Status: DISCONTINUED | OUTPATIENT
Start: 2022-01-01 | End: 2022-01-01 | Stop reason: HOSPADM

## 2022-01-01 RX ORDER — POLYETHYLENE GLYCOL 3350 17 G/17G
17 POWDER, FOR SOLUTION ORAL 2 TIMES DAILY PRN
Status: DISCONTINUED | OUTPATIENT
Start: 2022-01-01 | End: 2022-01-01 | Stop reason: HOSPADM

## 2022-01-01 RX ORDER — FLUTICASONE PROPIONATE 220 UG/1
1 AEROSOL, METERED RESPIRATORY (INHALATION) EVERY 12 HOURS
Qty: 12 G | Refills: 1 | Status: SHIPPED | OUTPATIENT
Start: 2022-01-01

## 2022-01-01 RX ORDER — ACETAMINOPHEN 325 MG/1
650 TABLET ORAL EVERY 4 HOURS PRN
Qty: 50 TABLET | Refills: 0 | Status: SHIPPED | OUTPATIENT
Start: 2022-01-01

## 2022-01-01 RX ORDER — FLUTICASONE PROPIONATE 44 UG/1
2 AEROSOL, METERED RESPIRATORY (INHALATION) EVERY 12 HOURS
Qty: 10.6 G | Refills: 3 | Status: SHIPPED | OUTPATIENT
Start: 2022-01-01 | End: 2022-01-01

## 2022-01-01 RX ORDER — HYDROMORPHONE HCL IN WATER/PF 6 MG/30 ML
.2-.5 PATIENT CONTROLLED ANALGESIA SYRINGE INTRAVENOUS
Status: DISCONTINUED | OUTPATIENT
Start: 2022-01-01 | End: 2022-01-01

## 2022-01-01 RX ORDER — NICOTINE POLACRILEX 4 MG
15-30 LOZENGE BUCCAL
Status: DISCONTINUED | OUTPATIENT
Start: 2022-01-01 | End: 2022-01-01 | Stop reason: HOSPADM

## 2022-01-01 RX ORDER — ACETAMINOPHEN 500 MG
500 TABLET ORAL 2 TIMES DAILY
Status: ON HOLD | COMMUNITY
End: 2022-01-01

## 2022-01-01 RX ORDER — DEXTROSE MONOHYDRATE 25 G/50ML
25-50 INJECTION, SOLUTION INTRAVENOUS
Status: DISCONTINUED | OUTPATIENT
Start: 2022-01-01 | End: 2022-01-01 | Stop reason: HOSPADM

## 2022-01-01 RX ORDER — CEFTRIAXONE 2 G/1
2 INJECTION, POWDER, FOR SOLUTION INTRAMUSCULAR; INTRAVENOUS EVERY 24 HOURS
Status: COMPLETED | OUTPATIENT
Start: 2022-01-01 | End: 2022-01-01

## 2022-01-01 RX ORDER — PANTOPRAZOLE SODIUM 40 MG/1
40 TABLET, DELAYED RELEASE ORAL
Qty: 30 TABLET | Refills: 1 | Status: SHIPPED | OUTPATIENT
Start: 2022-01-01

## 2022-01-01 RX ORDER — ACETAMINOPHEN 325 MG/1
650 TABLET ORAL EVERY 4 HOURS PRN
Status: DISCONTINUED | OUTPATIENT
Start: 2022-01-01 | End: 2022-01-01

## 2022-01-01 RX ORDER — FLUTICASONE PROPIONATE 44 UG/1
2 AEROSOL, METERED RESPIRATORY (INHALATION) EVERY 12 HOURS
Status: DISCONTINUED | OUTPATIENT
Start: 2022-01-01 | End: 2022-01-01

## 2022-01-01 RX ORDER — ACETAMINOPHEN 325 MG/1
650 TABLET ORAL EVERY 4 HOURS PRN
Qty: 50 TABLET | Refills: 0 | Status: SHIPPED | OUTPATIENT
Start: 2022-01-01 | End: 2022-01-01

## 2022-01-01 RX ORDER — GUAIFENESIN 600 MG/1
15 TABLET, EXTENDED RELEASE ORAL DAILY
Status: DISCONTINUED | OUTPATIENT
Start: 2022-01-01 | End: 2022-01-01

## 2022-01-01 RX ORDER — LACTOBACILLUS RHAMNOSUS GG 10B CELL
1 CAPSULE ORAL 2 TIMES DAILY
Status: DISCONTINUED | OUTPATIENT
Start: 2022-01-01 | End: 2022-01-01 | Stop reason: HOSPADM

## 2022-01-01 RX ORDER — FLUTICASONE PROPIONATE 220 UG/1
1 AEROSOL, METERED RESPIRATORY (INHALATION) EVERY 12 HOURS
Qty: 12 G | Refills: 1 | Status: SHIPPED | OUTPATIENT
Start: 2022-01-01 | End: 2022-01-01

## 2022-01-01 RX ORDER — NICOTINE POLACRILEX 4 MG
15-30 LOZENGE BUCCAL
Status: DISCONTINUED | OUTPATIENT
Start: 2022-01-01 | End: 2022-01-01

## 2022-01-01 RX ORDER — ONDANSETRON 2 MG/ML
4 INJECTION INTRAMUSCULAR; INTRAVENOUS EVERY 6 HOURS PRN
Status: DISCONTINUED | OUTPATIENT
Start: 2022-01-01 | End: 2022-01-01 | Stop reason: HOSPADM

## 2022-01-01 RX ORDER — PANTOPRAZOLE SODIUM 40 MG/1
40 TABLET, DELAYED RELEASE ORAL
Status: DISCONTINUED | OUTPATIENT
Start: 2022-01-01 | End: 2022-01-01 | Stop reason: HOSPADM

## 2022-01-01 RX ORDER — ATORVASTATIN CALCIUM 40 MG/1
40 TABLET, FILM COATED ORAL AT BEDTIME
COMMUNITY

## 2022-01-01 RX ORDER — ATORVASTATIN CALCIUM 40 MG/1
40 TABLET, FILM COATED ORAL AT BEDTIME
Status: DISCONTINUED | OUTPATIENT
Start: 2022-01-01 | End: 2022-01-01

## 2022-01-01 RX ORDER — LACTOBACILLUS RHAMNOSUS GG 10B CELL
1 CAPSULE ORAL 2 TIMES DAILY
Status: DISCONTINUED | OUTPATIENT
Start: 2022-01-01 | End: 2022-01-01

## 2022-01-01 RX ORDER — AMOXICILLIN 250 MG
1 CAPSULE ORAL
Status: DISCONTINUED | OUTPATIENT
Start: 2022-01-01 | End: 2022-01-01 | Stop reason: HOSPADM

## 2022-01-01 RX ADMIN — POTASSIUM CHLORIDE 10 MEQ: 7.46 INJECTION, SOLUTION INTRAVENOUS at 05:28

## 2022-01-01 RX ADMIN — PANTOPRAZOLE SODIUM 40 MG: 40 TABLET, DELAYED RELEASE ORAL at 08:07

## 2022-01-01 RX ADMIN — Medication 1 CAPSULE: at 07:53

## 2022-01-01 RX ADMIN — APIXABAN 5 MG: 5 TABLET, FILM COATED ORAL at 07:38

## 2022-01-01 RX ADMIN — FUROSEMIDE 20 MG: 20 TABLET ORAL at 08:28

## 2022-01-01 RX ADMIN — DEXAMETHASONE SODIUM PHOSPHATE 8 MG: 4 INJECTION, SOLUTION INTRA-ARTICULAR; INTRALESIONAL; INTRAMUSCULAR; INTRAVENOUS; SOFT TISSUE at 02:27

## 2022-01-01 RX ADMIN — PROPOFOL 15 MCG/KG/MIN: 10 INJECTION, EMULSION INTRAVENOUS at 05:11

## 2022-01-01 RX ADMIN — DEXAMETHASONE SODIUM PHOSPHATE 8 MG: 4 INJECTION, SOLUTION INTRA-ARTICULAR; INTRALESIONAL; INTRAMUSCULAR; INTRAVENOUS; SOFT TISSUE at 19:33

## 2022-01-01 RX ADMIN — INSULIN ASPART 1 UNITS: 100 INJECTION, SOLUTION INTRAVENOUS; SUBCUTANEOUS at 20:58

## 2022-01-01 RX ADMIN — APIXABAN 5 MG: 5 TABLET, FILM COATED ORAL at 09:17

## 2022-01-01 RX ADMIN — HEPARIN SODIUM 5000 UNITS: 5000 INJECTION, SOLUTION INTRAVENOUS; SUBCUTANEOUS at 17:29

## 2022-01-01 RX ADMIN — PROPOFOL 40 MCG/KG/MIN: 10 INJECTION, EMULSION INTRAVENOUS at 18:14

## 2022-01-01 RX ADMIN — Medication 1 CAPSULE: at 07:38

## 2022-01-01 RX ADMIN — DEXAMETHASONE SODIUM PHOSPHATE 8 MG: 4 INJECTION, SOLUTION INTRA-ARTICULAR; INTRALESIONAL; INTRAMUSCULAR; INTRAVENOUS; SOFT TISSUE at 18:22

## 2022-01-01 RX ADMIN — APIXABAN 5 MG: 5 TABLET, FILM COATED ORAL at 08:24

## 2022-01-01 RX ADMIN — INSULIN ASPART 1 UNITS: 100 INJECTION, SOLUTION INTRAVENOUS; SUBCUTANEOUS at 04:05

## 2022-01-01 RX ADMIN — PROPOFOL 20 MCG/KG/MIN: 10 INJECTION, EMULSION INTRAVENOUS at 18:36

## 2022-01-01 RX ADMIN — ATORVASTATIN CALCIUM 40 MG: 40 TABLET, FILM COATED ORAL at 20:47

## 2022-01-01 RX ADMIN — SODIUM CHLORIDE, POTASSIUM CHLORIDE, SODIUM LACTATE AND CALCIUM CHLORIDE: 600; 310; 30; 20 INJECTION, SOLUTION INTRAVENOUS at 12:04

## 2022-01-01 RX ADMIN — Medication 1 CAPSULE: at 08:09

## 2022-01-01 RX ADMIN — DEXAMETHASONE SODIUM PHOSPHATE 8 MG: 4 INJECTION, SOLUTION INTRA-ARTICULAR; INTRALESIONAL; INTRAMUSCULAR; INTRAVENOUS; SOFT TISSUE at 08:05

## 2022-01-01 RX ADMIN — APIXABAN 5 MG: 5 TABLET, FILM COATED ORAL at 07:59

## 2022-01-01 RX ADMIN — PROPOFOL 30 MCG/KG/MIN: 10 INJECTION, EMULSION INTRAVENOUS at 22:32

## 2022-01-01 RX ADMIN — FUROSEMIDE 20 MG: 20 TABLET ORAL at 08:08

## 2022-01-01 RX ADMIN — APIXABAN 5 MG: 5 TABLET, FILM COATED ORAL at 19:55

## 2022-01-01 RX ADMIN — FLUTICASONE PROPIONATE 1 PUFF: 220 AEROSOL, METERED RESPIRATORY (INHALATION) at 08:03

## 2022-01-01 RX ADMIN — PANTOPRAZOLE SODIUM 40 MG: 40 TABLET, DELAYED RELEASE ORAL at 07:39

## 2022-01-01 RX ADMIN — INSULIN ASPART 1 UNITS: 100 INJECTION, SOLUTION INTRAVENOUS; SUBCUTANEOUS at 23:59

## 2022-01-01 RX ADMIN — PIPERACILLIN AND TAZOBACTAM 3.38 G: 3; .375 INJECTION, POWDER, LYOPHILIZED, FOR SOLUTION INTRAVENOUS at 08:21

## 2022-01-01 RX ADMIN — Medication 1 CAPSULE: at 19:33

## 2022-01-01 RX ADMIN — Medication 1 CAPSULE: at 08:29

## 2022-01-01 RX ADMIN — Medication 1 CAPSULE: at 08:07

## 2022-01-01 RX ADMIN — PROPOFOL 10 MCG/KG/MIN: 10 INJECTION, EMULSION INTRAVENOUS at 11:00

## 2022-01-01 RX ADMIN — ATORVASTATIN CALCIUM 40 MG: 40 TABLET, FILM COATED ORAL at 20:19

## 2022-01-01 RX ADMIN — APIXABAN 5 MG: 5 TABLET, FILM COATED ORAL at 19:45

## 2022-01-01 RX ADMIN — NOREPINEPHRINE BITARTRATE 0.03 MCG/KG/MIN: 1 INJECTION, SOLUTION, CONCENTRATE INTRAVENOUS at 18:17

## 2022-01-01 RX ADMIN — FUROSEMIDE 20 MG: 20 TABLET ORAL at 07:46

## 2022-01-01 RX ADMIN — Medication 1 TABLET: at 08:08

## 2022-01-01 RX ADMIN — FUROSEMIDE 20 MG: 20 TABLET ORAL at 09:17

## 2022-01-01 RX ADMIN — DEXAMETHASONE SODIUM PHOSPHATE 8 MG: 4 INJECTION, SOLUTION INTRA-ARTICULAR; INTRALESIONAL; INTRAMUSCULAR; INTRAVENOUS; SOFT TISSUE at 08:50

## 2022-01-01 RX ADMIN — DEXAMETHASONE SODIUM PHOSPHATE 8 MG: 4 INJECTION, SOLUTION INTRA-ARTICULAR; INTRALESIONAL; INTRAMUSCULAR; INTRAVENOUS; SOFT TISSUE at 21:51

## 2022-01-01 RX ADMIN — APIXABAN 5 MG: 5 TABLET, FILM COATED ORAL at 19:57

## 2022-01-01 RX ADMIN — APIXABAN 5 MG: 5 TABLET, FILM COATED ORAL at 08:29

## 2022-01-01 RX ADMIN — INSULIN ASPART 1 UNITS: 100 INJECTION, SOLUTION INTRAVENOUS; SUBCUTANEOUS at 12:27

## 2022-01-01 RX ADMIN — PANTOPRAZOLE SODIUM 40 MG: 40 TABLET, DELAYED RELEASE ORAL at 08:50

## 2022-01-01 RX ADMIN — ATORVASTATIN CALCIUM 40 MG: 40 TABLET, FILM COATED ORAL at 20:09

## 2022-01-01 RX ADMIN — FLUTICASONE PROPIONATE 1 PUFF: 220 AEROSOL, METERED RESPIRATORY (INHALATION) at 01:19

## 2022-01-01 RX ADMIN — ATORVASTATIN CALCIUM 40 MG: 40 TABLET, FILM COATED ORAL at 20:28

## 2022-01-01 RX ADMIN — FUROSEMIDE 20 MG: 20 TABLET ORAL at 08:24

## 2022-01-01 RX ADMIN — ACETAMINOPHEN 650 MG: 325 TABLET, FILM COATED ORAL at 08:52

## 2022-01-01 RX ADMIN — FLUTICASONE PROPIONATE 1 PUFF: 220 AEROSOL, METERED RESPIRATORY (INHALATION) at 20:18

## 2022-01-01 RX ADMIN — Medication 1 CAPSULE: at 08:00

## 2022-01-01 RX ADMIN — ATORVASTATIN CALCIUM 40 MG: 40 TABLET, FILM COATED ORAL at 19:33

## 2022-01-01 RX ADMIN — Medication 1 CAPSULE: at 09:17

## 2022-01-01 RX ADMIN — Medication 1 TABLET: at 07:59

## 2022-01-01 RX ADMIN — INSULIN ASPART 1 UNITS: 100 INJECTION, SOLUTION INTRAVENOUS; SUBCUTANEOUS at 12:19

## 2022-01-01 RX ADMIN — APIXABAN 5 MG: 5 TABLET, FILM COATED ORAL at 08:09

## 2022-01-01 RX ADMIN — AMOXICILLIN AND CLAVULANATE POTASSIUM 1 TABLET: 875; 125 TABLET, FILM COATED ORAL at 07:46

## 2022-01-01 RX ADMIN — Medication 1 CAPSULE: at 20:09

## 2022-01-01 RX ADMIN — FLUTICASONE PROPIONATE 1 PUFF: 220 AEROSOL, METERED RESPIRATORY (INHALATION) at 07:57

## 2022-01-01 RX ADMIN — INSULIN ASPART 1 UNITS: 100 INJECTION, SOLUTION INTRAVENOUS; SUBCUTANEOUS at 08:21

## 2022-01-01 RX ADMIN — Medication 1 CAPSULE: at 20:19

## 2022-01-01 RX ADMIN — APIXABAN 5 MG: 5 TABLET, FILM COATED ORAL at 20:03

## 2022-01-01 RX ADMIN — APIXABAN 5 MG: 5 TABLET, FILM COATED ORAL at 08:12

## 2022-01-01 RX ADMIN — APIXABAN 5 MG: 5 TABLET, FILM COATED ORAL at 08:06

## 2022-01-01 RX ADMIN — ATORVASTATIN CALCIUM 40 MG: 40 TABLET, FILM COATED ORAL at 19:26

## 2022-01-01 RX ADMIN — FUROSEMIDE 20 MG: 20 TABLET ORAL at 09:10

## 2022-01-01 RX ADMIN — HEPARIN SODIUM 5000 UNITS: 5000 INJECTION, SOLUTION INTRAVENOUS; SUBCUTANEOUS at 08:51

## 2022-01-01 RX ADMIN — SODIUM CHLORIDE, POTASSIUM CHLORIDE, SODIUM LACTATE AND CALCIUM CHLORIDE: 600; 310; 30; 20 INJECTION, SOLUTION INTRAVENOUS at 04:53

## 2022-01-01 RX ADMIN — APIXABAN 5 MG: 5 TABLET, FILM COATED ORAL at 19:33

## 2022-01-01 RX ADMIN — Medication 1 CAPSULE: at 19:54

## 2022-01-01 RX ADMIN — Medication 40 MG: at 08:17

## 2022-01-01 RX ADMIN — INSULIN ASPART 1 UNITS: 100 INJECTION, SOLUTION INTRAVENOUS; SUBCUTANEOUS at 15:47

## 2022-01-01 RX ADMIN — Medication 1 CAPSULE: at 20:51

## 2022-01-01 RX ADMIN — APIXABAN 5 MG: 5 TABLET, FILM COATED ORAL at 20:28

## 2022-01-01 RX ADMIN — FUROSEMIDE 20 MG: 20 TABLET ORAL at 07:53

## 2022-01-01 RX ADMIN — APIXABAN 5 MG: 5 TABLET, FILM COATED ORAL at 07:46

## 2022-01-01 RX ADMIN — FUROSEMIDE 20 MG: 20 TABLET ORAL at 07:40

## 2022-01-01 RX ADMIN — APIXABAN 5 MG: 5 TABLET, FILM COATED ORAL at 08:17

## 2022-01-01 RX ADMIN — APIXABAN 5 MG: 5 TABLET, FILM COATED ORAL at 19:26

## 2022-01-01 RX ADMIN — APIXABAN 5 MG: 5 TABLET, FILM COATED ORAL at 19:30

## 2022-01-01 RX ADMIN — APIXABAN 5 MG: 5 TABLET, FILM COATED ORAL at 09:10

## 2022-01-01 RX ADMIN — FLUTICASONE PROPIONATE 1 PUFF: 220 AEROSOL, METERED RESPIRATORY (INHALATION) at 12:58

## 2022-01-01 RX ADMIN — FLUTICASONE PROPIONATE 1 PUFF: 220 AEROSOL, METERED RESPIRATORY (INHALATION) at 20:28

## 2022-01-01 RX ADMIN — Medication 1 TABLET: at 07:53

## 2022-01-01 RX ADMIN — APIXABAN 5 MG: 5 TABLET, FILM COATED ORAL at 20:19

## 2022-01-01 RX ADMIN — ATORVASTATIN CALCIUM 40 MG: 40 TABLET, FILM COATED ORAL at 21:50

## 2022-01-01 RX ADMIN — APIXABAN 5 MG: 5 TABLET, FILM COATED ORAL at 20:26

## 2022-01-01 RX ADMIN — HYDROMORPHONE HYDROCHLORIDE 0.4 MG: 0.2 INJECTION, SOLUTION INTRAMUSCULAR; INTRAVENOUS; SUBCUTANEOUS at 17:28

## 2022-01-01 RX ADMIN — DEXAMETHASONE SODIUM PHOSPHATE 8 MG: 4 INJECTION, SOLUTION INTRA-ARTICULAR; INTRALESIONAL; INTRAMUSCULAR; INTRAVENOUS; SOFT TISSUE at 11:31

## 2022-01-01 RX ADMIN — ATORVASTATIN CALCIUM 40 MG: 40 TABLET, FILM COATED ORAL at 19:54

## 2022-01-01 RX ADMIN — LIDOCAINE HYDROCHLORIDE 1 ML: 10 INJECTION, SOLUTION EPIDURAL; INFILTRATION; INTRACAUDAL; PERINEURAL at 16:57

## 2022-01-01 RX ADMIN — INSULIN ASPART 1 UNITS: 100 INJECTION, SOLUTION INTRAVENOUS; SUBCUTANEOUS at 08:26

## 2022-01-01 RX ADMIN — Medication 1 TABLET: at 08:07

## 2022-01-01 RX ADMIN — ROCURONIUM BROMIDE 100 MG: 10 INJECTION INTRAVENOUS at 15:09

## 2022-01-01 RX ADMIN — OXYCODONE HYDROCHLORIDE 5 MG: 5 TABLET ORAL at 10:36

## 2022-01-01 RX ADMIN — APIXABAN 5 MG: 5 TABLET, FILM COATED ORAL at 07:39

## 2022-01-01 RX ADMIN — INSULIN ASPART 1 UNITS: 100 INJECTION, SOLUTION INTRAVENOUS; SUBCUTANEOUS at 03:52

## 2022-01-01 RX ADMIN — Medication 1 CAPSULE: at 19:57

## 2022-01-01 RX ADMIN — APIXABAN 5 MG: 5 TABLET, FILM COATED ORAL at 07:53

## 2022-01-01 RX ADMIN — APIXABAN 5 MG: 5 TABLET, FILM COATED ORAL at 20:48

## 2022-01-01 RX ADMIN — HEPARIN SODIUM 5000 UNITS: 5000 INJECTION, SOLUTION INTRAVENOUS; SUBCUTANEOUS at 08:21

## 2022-01-01 RX ADMIN — Medication 1 TABLET: at 09:17

## 2022-01-01 RX ADMIN — ATORVASTATIN CALCIUM 40 MG: 40 TABLET, FILM COATED ORAL at 20:26

## 2022-01-01 RX ADMIN — FLUTICASONE PROPIONATE 2 PUFF: 44 AEROSOL, METERED RESPIRATORY (INHALATION) at 08:24

## 2022-01-01 RX ADMIN — MULTIVITAMIN 15 ML: LIQUID ORAL at 08:21

## 2022-01-01 RX ADMIN — PANTOPRAZOLE SODIUM 40 MG: 40 INJECTION, POWDER, FOR SOLUTION INTRAVENOUS at 07:53

## 2022-01-01 RX ADMIN — APIXABAN 5 MG: 5 TABLET, FILM COATED ORAL at 20:09

## 2022-01-01 RX ADMIN — AMOXICILLIN AND CLAVULANATE POTASSIUM 1 TABLET: 875; 125 TABLET, FILM COATED ORAL at 20:51

## 2022-01-01 RX ADMIN — PANTOPRAZOLE SODIUM 40 MG: 40 TABLET, DELAYED RELEASE ORAL at 08:05

## 2022-01-01 RX ADMIN — CEFTRIAXONE SODIUM 2 G: 2 INJECTION, POWDER, FOR SOLUTION INTRAMUSCULAR; INTRAVENOUS at 14:15

## 2022-01-01 RX ADMIN — Medication 1 CAPSULE: at 08:17

## 2022-01-01 RX ADMIN — Medication 0.3 MG/HR: at 05:03

## 2022-01-01 RX ADMIN — ATORVASTATIN CALCIUM 40 MG: 40 TABLET, FILM COATED ORAL at 19:57

## 2022-01-01 RX ADMIN — Medication 1 TABLET: at 07:58

## 2022-01-01 RX ADMIN — DEXAMETHASONE SODIUM PHOSPHATE 8 MG: 4 INJECTION, SOLUTION INTRA-ARTICULAR; INTRALESIONAL; INTRAMUSCULAR; INTRAVENOUS; SOFT TISSUE at 06:22

## 2022-01-01 RX ADMIN — FUROSEMIDE 20 MG: 20 TABLET ORAL at 08:50

## 2022-01-01 RX ADMIN — Medication 1 CAPSULE: at 20:47

## 2022-01-01 RX ADMIN — PIPERACILLIN AND TAZOBACTAM 3.38 G: 3; .375 INJECTION, POWDER, LYOPHILIZED, FOR SOLUTION INTRAVENOUS at 02:12

## 2022-01-01 RX ADMIN — DEXMEDETOMIDINE HYDROCHLORIDE 0.2 MCG/KG/HR: 400 INJECTION INTRAVENOUS at 06:22

## 2022-01-01 RX ADMIN — APIXABAN 5 MG: 5 TABLET, FILM COATED ORAL at 20:49

## 2022-01-01 RX ADMIN — ALBUTEROL SULFATE 2 PUFF: 90 AEROSOL, METERED RESPIRATORY (INHALATION) at 13:10

## 2022-01-01 RX ADMIN — FUROSEMIDE 20 MG: 20 TABLET ORAL at 07:38

## 2022-01-01 RX ADMIN — FLUTICASONE PROPIONATE 1 PUFF: 220 AEROSOL, METERED RESPIRATORY (INHALATION) at 20:51

## 2022-01-01 RX ADMIN — CEFTRIAXONE SODIUM 2 G: 2 INJECTION, POWDER, FOR SOLUTION INTRAMUSCULAR; INTRAVENOUS at 14:41

## 2022-01-01 RX ADMIN — APIXABAN 5 MG: 5 TABLET, FILM COATED ORAL at 07:58

## 2022-01-01 RX ADMIN — Medication 1 CAPSULE: at 08:49

## 2022-01-01 RX ADMIN — INSULIN ASPART 1 UNITS: 100 INJECTION, SOLUTION INTRAVENOUS; SUBCUTANEOUS at 04:13

## 2022-01-01 RX ADMIN — PIPERACILLIN AND TAZOBACTAM 3.38 G: 3; .375 INJECTION, POWDER, LYOPHILIZED, FOR SOLUTION INTRAVENOUS at 17:28

## 2022-01-01 RX ADMIN — MULTIVITAMIN 15 ML: LIQUID ORAL at 17:28

## 2022-01-01 RX ADMIN — PIPERACILLIN AND TAZOBACTAM 3.38 G: 3; .375 INJECTION, POWDER, LYOPHILIZED, FOR SOLUTION INTRAVENOUS at 08:51

## 2022-01-01 RX ADMIN — INSULIN ASPART 1 UNITS: 100 INJECTION, SOLUTION INTRAVENOUS; SUBCUTANEOUS at 04:14

## 2022-01-01 RX ADMIN — Medication 1 TABLET: at 08:09

## 2022-01-01 RX ADMIN — Medication 1 TABLET: at 08:49

## 2022-01-01 RX ADMIN — Medication 25 MCG/HR: at 19:15

## 2022-01-01 RX ADMIN — DEXAMETHASONE SODIUM PHOSPHATE 8 MG: 4 INJECTION, SOLUTION INTRA-ARTICULAR; INTRALESIONAL; INTRAMUSCULAR; INTRAVENOUS; SOFT TISSUE at 10:12

## 2022-01-01 RX ADMIN — DEXAMETHASONE SODIUM PHOSPHATE 8 MG: 4 INJECTION, SOLUTION INTRA-ARTICULAR; INTRALESIONAL; INTRAMUSCULAR; INTRAVENOUS; SOFT TISSUE at 01:59

## 2022-01-01 RX ADMIN — Medication 1 CAPSULE: at 21:05

## 2022-01-01 RX ADMIN — Medication 1 TABLET: at 08:17

## 2022-01-01 RX ADMIN — Medication 1 TABLET: at 07:38

## 2022-01-01 RX ADMIN — SODIUM CHLORIDE, POTASSIUM CHLORIDE, SODIUM LACTATE AND CALCIUM CHLORIDE: 600; 310; 30; 20 INJECTION, SOLUTION INTRAVENOUS at 23:58

## 2022-01-01 RX ADMIN — INSULIN ASPART 2 UNITS: 100 INJECTION, SOLUTION INTRAVENOUS; SUBCUTANEOUS at 11:51

## 2022-01-01 RX ADMIN — ATORVASTATIN CALCIUM 40 MG: 40 TABLET, FILM COATED ORAL at 22:23

## 2022-01-01 RX ADMIN — Medication 1 CAPSULE: at 07:39

## 2022-01-01 RX ADMIN — INSULIN ASPART 1 UNITS: 100 INJECTION, SOLUTION INTRAVENOUS; SUBCUTANEOUS at 20:01

## 2022-01-01 RX ADMIN — FUROSEMIDE 20 MG: 20 TABLET ORAL at 08:06

## 2022-01-01 RX ADMIN — FLUTICASONE PROPIONATE 1 PUFF: 220 AEROSOL, METERED RESPIRATORY (INHALATION) at 08:25

## 2022-01-01 RX ADMIN — PROPOFOL 40 MCG/KG/MIN: 10 INJECTION, EMULSION INTRAVENOUS at 21:47

## 2022-01-01 RX ADMIN — PROPOFOL 20 MCG/KG/MIN: 10 INJECTION, EMULSION INTRAVENOUS at 03:58

## 2022-01-01 RX ADMIN — INSULIN ASPART 1 UNITS: 100 INJECTION, SOLUTION INTRAVENOUS; SUBCUTANEOUS at 15:53

## 2022-01-01 RX ADMIN — SODIUM CHLORIDE, POTASSIUM CHLORIDE, SODIUM LACTATE AND CALCIUM CHLORIDE 500 ML: 600; 310; 30; 20 INJECTION, SOLUTION INTRAVENOUS at 16:54

## 2022-01-01 RX ADMIN — ATORVASTATIN CALCIUM 40 MG: 40 TABLET, FILM COATED ORAL at 21:44

## 2022-01-01 RX ADMIN — INSULIN ASPART 1 UNITS: 100 INJECTION, SOLUTION INTRAVENOUS; SUBCUTANEOUS at 20:39

## 2022-01-01 RX ADMIN — SODIUM CHLORIDE, POTASSIUM CHLORIDE, SODIUM LACTATE AND CALCIUM CHLORIDE: 600; 310; 30; 20 INJECTION, SOLUTION INTRAVENOUS at 22:15

## 2022-01-01 RX ADMIN — ATORVASTATIN CALCIUM 40 MG: 40 TABLET, FILM COATED ORAL at 20:07

## 2022-01-01 RX ADMIN — APIXABAN 5 MG: 5 TABLET, FILM COATED ORAL at 20:07

## 2022-01-01 RX ADMIN — Medication 1 CAPSULE: at 09:09

## 2022-01-01 RX ADMIN — PROPOFOL 30 MCG/KG/MIN: 10 INJECTION, EMULSION INTRAVENOUS at 01:36

## 2022-01-01 RX ADMIN — Medication 1 CAPSULE: at 19:59

## 2022-01-01 RX ADMIN — DEXAMETHASONE SODIUM PHOSPHATE 8 MG: 4 INJECTION, SOLUTION INTRA-ARTICULAR; INTRALESIONAL; INTRAMUSCULAR; INTRAVENOUS; SOFT TISSUE at 15:51

## 2022-01-01 RX ADMIN — APIXABAN 5 MG: 5 TABLET, FILM COATED ORAL at 21:49

## 2022-01-01 RX ADMIN — INSULIN ASPART 1 UNITS: 100 INJECTION, SOLUTION INTRAVENOUS; SUBCUTANEOUS at 23:44

## 2022-01-01 RX ADMIN — Medication 1 TABLET: at 07:46

## 2022-01-01 RX ADMIN — PROPOFOL 30 MCG/KG/MIN: 10 INJECTION, EMULSION INTRAVENOUS at 16:59

## 2022-01-01 RX ADMIN — FUROSEMIDE 20 MG: 20 TABLET ORAL at 08:07

## 2022-01-01 RX ADMIN — INSULIN ASPART 2 UNITS: 100 INJECTION, SOLUTION INTRAVENOUS; SUBCUTANEOUS at 20:05

## 2022-01-01 RX ADMIN — PANTOPRAZOLE SODIUM 40 MG: 40 TABLET, DELAYED RELEASE ORAL at 08:24

## 2022-01-01 RX ADMIN — PANTOPRAZOLE SODIUM 40 MG: 40 INJECTION, POWDER, FOR SOLUTION INTRAVENOUS at 08:21

## 2022-01-01 RX ADMIN — PROPOFOL 10 MCG/KG/MIN: 10 INJECTION, EMULSION INTRAVENOUS at 11:58

## 2022-01-01 RX ADMIN — INSULIN ASPART 1 UNITS: 100 INJECTION, SOLUTION INTRAVENOUS; SUBCUTANEOUS at 23:48

## 2022-01-01 RX ADMIN — INSULIN ASPART 2 UNITS: 100 INJECTION, SOLUTION INTRAVENOUS; SUBCUTANEOUS at 11:50

## 2022-01-01 RX ADMIN — SODIUM CHLORIDE, POTASSIUM CHLORIDE, SODIUM LACTATE AND CALCIUM CHLORIDE: 600; 310; 30; 20 INJECTION, SOLUTION INTRAVENOUS at 11:59

## 2022-01-01 RX ADMIN — APIXABAN 5 MG: 5 TABLET, FILM COATED ORAL at 20:51

## 2022-01-01 RX ADMIN — SODIUM CHLORIDE, POTASSIUM CHLORIDE, SODIUM LACTATE AND CALCIUM CHLORIDE 500 ML: 600; 310; 30; 20 INJECTION, SOLUTION INTRAVENOUS at 13:33

## 2022-01-01 RX ADMIN — ATORVASTATIN CALCIUM 40 MG: 40 TABLET, FILM COATED ORAL at 22:32

## 2022-01-01 RX ADMIN — Medication 1 TABLET: at 08:28

## 2022-01-01 RX ADMIN — Medication 40 MG: at 08:09

## 2022-01-01 RX ADMIN — AMOXICILLIN AND CLAVULANATE POTASSIUM 1 TABLET: 875; 125 TABLET, FILM COATED ORAL at 21:04

## 2022-01-01 RX ADMIN — Medication 1 CAPSULE: at 20:07

## 2022-01-01 RX ADMIN — FLUTICASONE PROPIONATE 1 PUFF: 220 AEROSOL, METERED RESPIRATORY (INHALATION) at 21:42

## 2022-01-01 RX ADMIN — FLUTICASONE PROPIONATE 1 PUFF: 220 AEROSOL, METERED RESPIRATORY (INHALATION) at 08:09

## 2022-01-01 RX ADMIN — SODIUM CHLORIDE, POTASSIUM CHLORIDE, SODIUM LACTATE AND CALCIUM CHLORIDE: 600; 310; 30; 20 INJECTION, SOLUTION INTRAVENOUS at 17:49

## 2022-01-01 RX ADMIN — Medication 1 CAPSULE: at 20:28

## 2022-01-01 RX ADMIN — IOPAMIDOL 100 ML: 755 INJECTION, SOLUTION INTRAVENOUS at 18:35

## 2022-01-01 RX ADMIN — Medication 0.1 MG/HR: at 05:45

## 2022-01-01 RX ADMIN — AMOXICILLIN AND CLAVULANATE POTASSIUM 1 TABLET: 875; 125 TABLET, FILM COATED ORAL at 07:59

## 2022-01-01 RX ADMIN — FUROSEMIDE 20 MG: 20 TABLET ORAL at 08:00

## 2022-01-01 RX ADMIN — PROPOFOL 20 MCG/KG/MIN: 10 INJECTION, EMULSION INTRAVENOUS at 08:21

## 2022-01-01 RX ADMIN — ATORVASTATIN CALCIUM 40 MG: 40 TABLET, FILM COATED ORAL at 19:59

## 2022-01-01 RX ADMIN — Medication 1 TABLET: at 08:06

## 2022-01-01 RX ADMIN — APIXABAN 5 MG: 5 TABLET, FILM COATED ORAL at 08:50

## 2022-01-01 RX ADMIN — Medication 1 CAPSULE: at 08:06

## 2022-01-01 RX ADMIN — DEXAMETHASONE SODIUM PHOSPHATE 8 MG: 4 INJECTION, SOLUTION INTRA-ARTICULAR; INTRALESIONAL; INTRAMUSCULAR; INTRAVENOUS; SOFT TISSUE at 17:28

## 2022-01-01 RX ADMIN — FUROSEMIDE 20 MG: 20 TABLET ORAL at 07:58

## 2022-01-01 RX ADMIN — FLUTICASONE PROPIONATE 1 PUFF: 220 AEROSOL, METERED RESPIRATORY (INHALATION) at 07:45

## 2022-01-01 RX ADMIN — PANTOPRAZOLE SODIUM 40 MG: 40 TABLET, DELAYED RELEASE ORAL at 17:02

## 2022-01-01 RX ADMIN — PROPOFOL 20 MCG/KG/MIN: 10 INJECTION, EMULSION INTRAVENOUS at 20:12

## 2022-01-01 RX ADMIN — ATORVASTATIN CALCIUM 40 MG: 40 TABLET, FILM COATED ORAL at 21:05

## 2022-01-01 RX ADMIN — POTASSIUM CHLORIDE 10 MEQ: 7.46 INJECTION, SOLUTION INTRAVENOUS at 06:35

## 2022-01-01 RX ADMIN — Medication 12 MG: at 15:09

## 2022-01-01 RX ADMIN — Medication 1 TABLET: at 09:09

## 2022-01-01 RX ADMIN — DEXAMETHASONE SODIUM PHOSPHATE 8 MG: 4 INJECTION, SOLUTION INTRA-ARTICULAR; INTRALESIONAL; INTRAMUSCULAR; INTRAVENOUS; SOFT TISSUE at 23:41

## 2022-01-01 RX ADMIN — APIXABAN 5 MG: 5 TABLET, FILM COATED ORAL at 20:05

## 2022-01-01 RX ADMIN — HEPARIN SODIUM 5000 UNITS: 5000 INJECTION, SOLUTION INTRAVENOUS; SUBCUTANEOUS at 00:28

## 2022-01-01 RX ADMIN — Medication 1 TABLET: at 07:39

## 2022-01-01 RX ADMIN — SODIUM CHLORIDE, POTASSIUM CHLORIDE, SODIUM LACTATE AND CALCIUM CHLORIDE: 600; 310; 30; 20 INJECTION, SOLUTION INTRAVENOUS at 00:28

## 2022-01-01 RX ADMIN — Medication 1 CAPSULE: at 19:25

## 2022-01-01 RX ADMIN — INSULIN ASPART 1 UNITS: 100 INJECTION, SOLUTION INTRAVENOUS; SUBCUTANEOUS at 20:29

## 2022-01-01 RX ADMIN — FLUTICASONE PROPIONATE 2 PUFF: 44 AEROSOL, METERED RESPIRATORY (INHALATION) at 22:01

## 2022-01-01 RX ADMIN — DEXAMETHASONE SODIUM PHOSPHATE 8 MG: 4 INJECTION, SOLUTION INTRA-ARTICULAR; INTRALESIONAL; INTRAMUSCULAR; INTRAVENOUS; SOFT TISSUE at 00:28

## 2022-01-01 RX ADMIN — AMOXICILLIN AND CLAVULANATE POTASSIUM 1 TABLET: 875; 125 TABLET, FILM COATED ORAL at 07:58

## 2022-01-01 RX ADMIN — PIPERACILLIN AND TAZOBACTAM 3.38 G: 3; .375 INJECTION, POWDER, LYOPHILIZED, FOR SOLUTION INTRAVENOUS at 20:36

## 2022-01-01 RX ADMIN — Medication 10 MG: at 12:53

## 2022-01-01 RX ADMIN — PANTOPRAZOLE SODIUM 40 MG: 40 TABLET, DELAYED RELEASE ORAL at 16:40

## 2022-01-01 RX ADMIN — APIXABAN 5 MG: 5 TABLET, FILM COATED ORAL at 08:07

## 2022-01-01 RX ADMIN — PANTOPRAZOLE SODIUM 40 MG: 40 TABLET, DELAYED RELEASE ORAL at 08:12

## 2022-01-01 RX ADMIN — Medication 1 CAPSULE: at 20:49

## 2022-01-01 RX ADMIN — ATORVASTATIN CALCIUM 40 MG: 40 TABLET, FILM COATED ORAL at 20:49

## 2022-01-01 RX ADMIN — AMOXICILLIN AND CLAVULANATE POTASSIUM 1 TABLET: 875; 125 TABLET, FILM COATED ORAL at 19:44

## 2022-01-01 ASSESSMENT — ACTIVITIES OF DAILY LIVING (ADL)
ADLS_ACUITY_SCORE: 39
ADLS_ACUITY_SCORE: 39
ADLS_ACUITY_SCORE: 49
ADLS_ACUITY_SCORE: 35
ADLS_ACUITY_SCORE: 30
CONCENTRATING,_REMEMBERING_OR_MAKING_DECISIONS_DIFFICULTY: NO
ADLS_ACUITY_SCORE: 30
ADLS_ACUITY_SCORE: 32
ADLS_ACUITY_SCORE: 30
ADLS_ACUITY_SCORE: 30
ADLS_ACUITY_SCORE: 45
NUMBER_OF_TIMES_PATIENT_HAS_FALLEN_WITHIN_LAST_SIX_MONTHS: 1
ADLS_ACUITY_SCORE: 30
ADLS_ACUITY_SCORE: 30
ADLS_ACUITY_SCORE: 32
ADLS_ACUITY_SCORE: 28
ADLS_ACUITY_SCORE: 51
CHANGE_IN_FUNCTIONAL_STATUS_SINCE_ONSET_OF_CURRENT_ILLNESS/INJURY: YES
ADLS_ACUITY_SCORE: 30
ADLS_ACUITY_SCORE: 36
ADLS_ACUITY_SCORE: 32
ADLS_ACUITY_SCORE: 30
ADLS_ACUITY_SCORE: 30
ADLS_ACUITY_SCORE: 32
ADLS_ACUITY_SCORE: 40
ADLS_ACUITY_SCORE: 36
ADLS_ACUITY_SCORE: 39
HEARING_DIFFICULTY_OR_DEAF: NO
ADLS_ACUITY_SCORE: 30
ADLS_ACUITY_SCORE: 35
ADLS_ACUITY_SCORE: 32
ADLS_ACUITY_SCORE: 30
ADLS_ACUITY_SCORE: 39
ADLS_ACUITY_SCORE: 39
ADLS_ACUITY_SCORE: 32
ADLS_ACUITY_SCORE: 30
ADLS_ACUITY_SCORE: 39
ADLS_ACUITY_SCORE: 30
ADLS_ACUITY_SCORE: 51
ADLS_ACUITY_SCORE: 30
ADLS_ACUITY_SCORE: 39
ADLS_ACUITY_SCORE: 30
ADLS_ACUITY_SCORE: 32
FALL_HISTORY_WITHIN_LAST_SIX_MONTHS: YES
ADLS_ACUITY_SCORE: 30
ADLS_ACUITY_SCORE: 40
ADLS_ACUITY_SCORE: 39
ADLS_ACUITY_SCORE: 46
ADLS_ACUITY_SCORE: 51
ADLS_ACUITY_SCORE: 30
ADLS_ACUITY_SCORE: 39
ADLS_ACUITY_SCORE: 39
ADLS_ACUITY_SCORE: 30
WALKING_OR_CLIMBING_STAIRS_DIFFICULTY: NO
ADLS_ACUITY_SCORE: 45
ADLS_ACUITY_SCORE: 30
DOING_ERRANDS_INDEPENDENTLY_DIFFICULTY: YES
ADLS_ACUITY_SCORE: 39
ADLS_ACUITY_SCORE: 30
WEAR_GLASSES_OR_BLIND: YES
ADLS_ACUITY_SCORE: 30
ADLS_ACUITY_SCORE: 39
ADLS_ACUITY_SCORE: 30
TOILETING_ISSUES: NO
ADLS_ACUITY_SCORE: 30
ADLS_ACUITY_SCORE: 32
DEPENDENT_IADLS:: CLEANING;COOKING;LAUNDRY;SHOPPING;MEAL PREPARATION;MEDICATION MANAGEMENT;TRANSPORTATION
ADLS_ACUITY_SCORE: 39
ADLS_ACUITY_SCORE: 30
ADLS_ACUITY_SCORE: 39
ADLS_ACUITY_SCORE: 30
ADLS_ACUITY_SCORE: 39
ADLS_ACUITY_SCORE: 28
ADLS_ACUITY_SCORE: 30
DIFFICULTY_EATING/SWALLOWING: NO
ADLS_ACUITY_SCORE: 36
ADLS_ACUITY_SCORE: 30
ADLS_ACUITY_SCORE: 32
ADLS_ACUITY_SCORE: 51
ADLS_ACUITY_SCORE: 30
ADLS_ACUITY_SCORE: 51
ADLS_ACUITY_SCORE: 32
ADLS_ACUITY_SCORE: 32
ADLS_ACUITY_SCORE: 45
ADLS_ACUITY_SCORE: 30
ADLS_ACUITY_SCORE: 30
ADLS_ACUITY_SCORE: 39
ADLS_ACUITY_SCORE: 51
ADLS_ACUITY_SCORE: 36
ADLS_ACUITY_SCORE: 30
ADLS_ACUITY_SCORE: 39
ADLS_ACUITY_SCORE: 30
ADLS_ACUITY_SCORE: 51
ADLS_ACUITY_SCORE: 30
ADLS_ACUITY_SCORE: 45
ADLS_ACUITY_SCORE: 39
ADLS_ACUITY_SCORE: 30
DRESSING/BATHING_DIFFICULTY: NO
VISION_MANAGEMENT: GLASSES
ADLS_ACUITY_SCORE: 30
ADLS_ACUITY_SCORE: 39
ADLS_ACUITY_SCORE: 30
ADLS_ACUITY_SCORE: 39
ADLS_ACUITY_SCORE: 45
ADLS_ACUITY_SCORE: 51
ADLS_ACUITY_SCORE: 32
ADLS_ACUITY_SCORE: 39
ADLS_ACUITY_SCORE: 28
ADLS_ACUITY_SCORE: 32
ADLS_ACUITY_SCORE: 30
ADLS_ACUITY_SCORE: 36
ADLS_ACUITY_SCORE: 30
ADLS_ACUITY_SCORE: 32
ADLS_ACUITY_SCORE: 49
ADLS_ACUITY_SCORE: 30
ADLS_ACUITY_SCORE: 32
ADLS_ACUITY_SCORE: 51
ADLS_ACUITY_SCORE: 51
ADLS_ACUITY_SCORE: 39
ADLS_ACUITY_SCORE: 49
ADLS_ACUITY_SCORE: 45
ADLS_ACUITY_SCORE: 30
ADLS_ACUITY_SCORE: 32
ADLS_ACUITY_SCORE: 30
ADLS_ACUITY_SCORE: 32
ADLS_ACUITY_SCORE: 39
ADLS_ACUITY_SCORE: 30
ADLS_ACUITY_SCORE: 49
ADLS_ACUITY_SCORE: 32
ADLS_ACUITY_SCORE: 32
ADLS_ACUITY_SCORE: 30
ADLS_ACUITY_SCORE: 32
ADLS_ACUITY_SCORE: 39
ADLS_ACUITY_SCORE: 53
ADLS_ACUITY_SCORE: 30
ADLS_ACUITY_SCORE: 32
ADLS_ACUITY_SCORE: 28
ADLS_ACUITY_SCORE: 32
ADLS_ACUITY_SCORE: 39
ADLS_ACUITY_SCORE: 36
WEAR_GLASSES_OR_BLIND: YES
ADLS_ACUITY_SCORE: 30
ADLS_ACUITY_SCORE: 32
ADLS_ACUITY_SCORE: 30
ADLS_ACUITY_SCORE: 39
ADLS_ACUITY_SCORE: 30
ADLS_ACUITY_SCORE: 30
ADLS_ACUITY_SCORE: 45
ADLS_ACUITY_SCORE: 39
ADLS_ACUITY_SCORE: 32
ADLS_ACUITY_SCORE: 30
ADLS_ACUITY_SCORE: 28
ADLS_ACUITY_SCORE: 40
ADLS_ACUITY_SCORE: 49
ADLS_ACUITY_SCORE: 30
EQUIPMENT_CURRENTLY_USED_AT_HOME: WALKER, ROLLING
ADLS_ACUITY_SCORE: 28
ADLS_ACUITY_SCORE: 32
ADLS_ACUITY_SCORE: 49
ADLS_ACUITY_SCORE: 39
ADLS_ACUITY_SCORE: 53
ADLS_ACUITY_SCORE: 39
ADLS_ACUITY_SCORE: 30
ADLS_ACUITY_SCORE: 30
DIFFICULTY_COMMUNICATING: NO
ADLS_ACUITY_SCORE: 40
ADLS_ACUITY_SCORE: 30
ADLS_ACUITY_SCORE: 39
ADLS_ACUITY_SCORE: 28
ADLS_ACUITY_SCORE: 40
ADLS_ACUITY_SCORE: 30
ADLS_ACUITY_SCORE: 46
ADLS_ACUITY_SCORE: 30
ADLS_ACUITY_SCORE: 32
ADLS_ACUITY_SCORE: 30
ADLS_ACUITY_SCORE: 30
ADLS_ACUITY_SCORE: 39
ADLS_ACUITY_SCORE: 30
ADLS_ACUITY_SCORE: 39
ADLS_ACUITY_SCORE: 45
ADLS_ACUITY_SCORE: 30
ADLS_ACUITY_SCORE: 32
ADLS_ACUITY_SCORE: 32
ADLS_ACUITY_SCORE: 30
ADLS_ACUITY_SCORE: 30
ADLS_ACUITY_SCORE: 40
ADLS_ACUITY_SCORE: 30
ADLS_ACUITY_SCORE: 30
ADLS_ACUITY_SCORE: 51
ADLS_ACUITY_SCORE: 36
ADLS_ACUITY_SCORE: 30
ADLS_ACUITY_SCORE: 39
ADLS_ACUITY_SCORE: 30
ADLS_ACUITY_SCORE: 46
ADLS_ACUITY_SCORE: 39
ADLS_ACUITY_SCORE: 39
ADLS_ACUITY_SCORE: 53
ADLS_ACUITY_SCORE: 28
ADLS_ACUITY_SCORE: 30
ADLS_ACUITY_SCORE: 32
ADLS_ACUITY_SCORE: 39
ADLS_ACUITY_SCORE: 30
ADLS_ACUITY_SCORE: 39
ADLS_ACUITY_SCORE: 30
ADLS_ACUITY_SCORE: 46
ADLS_ACUITY_SCORE: 30
ADLS_ACUITY_SCORE: 32
ADLS_ACUITY_SCORE: 30
VISION_MANAGEMENT: GLASSES
ADLS_ACUITY_SCORE: 39
ADLS_ACUITY_SCORE: 36
ADLS_ACUITY_SCORE: 39

## 2022-10-09 PROBLEM — E04.9 GOITER: Status: ACTIVE | Noted: 2022-01-01

## 2022-10-09 NOTE — H&P
SURGICAL ICU ADMISSION NOTE  10/09/2022      PRIMARY TEAM: ENT  REASON FOR CRITICAL CARE ADMISSION: Ventilation need after upper airway obstruction  ADMITTING PHYSICIAN: Dr Gonzalez  Date of Service (when I saw the patient): 10/09/2022    ASSESSMENT:  Zachary Gan is a 82 year old male with hx of DVT, PE on apixiban, PVD, HTN,  who was transferred from OSH after he required intubation for respiratory distress relating to a thyroid nodule.     PLAN:    Neurological:  - Monitor neurological status. Delirium preventions and precautions.   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  # Tracheal deviation  - ENT following, will review recommendations     Pulmonary:   # Acute hypoxic respiratory support after upper airway obstruction  - VENT:   Vent Mode: CMV/AC  (Continuous Mandatory Ventilation/ Assist Control)  Resp Rate (Set): 16 breaths/min  Tidal Volume (Set, mL): 500 mL  PEEP (cm H2O): 5 cmH2O  FiO2 50%  - Continue full vent support for now while touching base with ENT re: obstruction  # Hx PE on apixiban (11/6/2017)  - Hold apxiban  - Monitor, no new PE or residual PE on CT (10/9)    Cardiovascular:    # ?chronic heart failure  -- MELODY 2/15/22 - EF 55-60  # PVD  # ? CAD  # HTN  - Pta: asa 81, atorvostatin 40 - med rec, hold for now  - monitor hemodynamics, not on pressors    Gastroenterology/Nutrition:  - npo  - NGT in place  - address feeds after ENT operative plans    Fluids/Electrolytes:   - LR at 100  - admission labs    Renal:  - admission labs, BL creat ~1.0-1.1  - mcpherson, monitor UOP.     Endocrine:  # DM2 controlled with diet  - start sliding scale insulin in setting of acute stress  - Goal to keep BG< 180    ID:  - no indication of acute infection  - send sputum sample  - admission labs    Heme:     # Hx DVT of RUE unspecified time  # Chronic disease anemia   - admission labs.  BL ~11  - Transfuse if hgb <7.0 or  signs/symptoms of hypoperfusion. Monitor and trend.     Musculoskeletal/Skin  # Venous stasis dermatitis of B/LE  # Thoracic spondylosis without myelopathy  - Physical and occupational therapy consult   - WOC consult    General Cares/Prophylaxis:    DVT Prophylaxis: hold dvt pplx,  GI Prophylaxis: PPI  Restraints: Restraints for medical healing needed: NO    Lines/ tubes/ drains:  - PIV x2  - ETT  - NGT    Disposition:  -  Surgical ICU    Patient seen, findings and plan discussed with ICU staff.    Dudley Rolle MD PGY2  General Surgery     - - - - - - - - - - - - - - - - - - - - - - - - - - - - - - - - - - - - - - - - - - - - - -   HISTORY PRESENTING ILLNESS:   Zachary Gan is a 82 year old male with hx of DVT, PE on apixiban, PVD, HTN, DM2 who was transferred from OSH after he required intubation for respiratory distress relating to a thyroid nodule. Of note he was seen in the ED at 10/5 and had work up for shortness of breath. He re-presented to the ED on 10/8 with increasing shortness of breath and cough after which he needed increasing supplemental oxygen and was intubated. CT at OSH was significant for a large R sided goiter/thyroid nodule with compression of surrounding structures. Due to need for specialty care he was transferred to [facility] for further cares    REVIEW OF SYSTEMS: unable to obtain    PAST MEDICAL HISTORY:   No past medical history on file.    SURGICAL HISTORY:   No past surgical history on file.    SOCIAL HISTORY:      Unable to obtain    FAMILY HISTORY:   Unable to obtain    ALLERGIES:   Not on File    MEDICATIONS:  No current facility-administered medications on file prior to encounter.  No current outpatient medications on file prior to encounter.      PHYSICAL EXAMINATION:     General: sedated, intubated  HEENT:asymetric neck though no palpable mass   Neuro: lethargic  Pulm/Resp: nonlabored on vent  CV: RRR  Abdomen: Soft, distended, nontender  : mcpherson catheter in place, urine yellow  and clear  Incisions/Skin: B/L lower extremity venous stasis   MSK/Extremities: +1-2 peripheral edema, WWP    LABS: Reviewed.   Arterial Blood Gases   No lab results found in last 7 days.  Complete Blood Count   No lab results found in last 7 days.  Basic Metabolic Panel  Recent Labs   Lab 10/09/22  0159   *     Liver Function Tests  No lab results found in last 7 days.  Pancreatic Enzymes  No lab results found in last 7 days.  Coagulation Profile  No lab results found in last 7 days.    IMAGING:  Recent Results (from the past 24 hour(s))   XR Chest Port 1 View    Impression    RESIDENT PRELIMINARY INTERPRETATION  IMPRESSION:   1. Endotracheal tube tip projects over the mid trachea approximately 5  cm above the mark.  2. Cardiomegaly with perihilar opacities, likely a combination of  atelectasis and edema.  3. Small bilateral pleural effusions.

## 2022-10-09 NOTE — PROGRESS NOTES
Critical Care Services Admit Note:  I personally examined and evaluated the patient today. I formulated today s plan with the house staff team or resident(s) and agree with the findings and plan in the associated note (see separately attested resident note).   I have evaluated all laboratory values and imaging studies from the past 24 hours.  Summary of hospital course:  82 year old male presents with upper airway obstruction and shortness of breath requiring intubation.  Was transferred from ED in [facility] after case was discussed with Dr Gonzalez in ENT    Data    Neck CT:  1. Enlarged multinodular thyroid goiter with substernal extension of the right lower lobe.   There is deviation of the trachea to the left at the thoracic inlet and narrowing of a   transverse dimension of the trachea to 9 mm.   2. Severe stenosis of the glottic airway which measures 2 mm in minimal transverse diameter. No    soft tissue mass identified.   3. 11 mm homogeneous soft tissue nodule in the superficial left frontal lobe is indeterminate.   This could represent an enlarged lymph node or a primary parotid neoplasm.     Assessment/plan:    Upper Airway Obstruction  - continue invasive mechanical ventilation  - will need to obtain images of neck  - ENT consult  - precedex  - hold apixaban    Rest per resident note.    I spent a total of 35 minutes (excluding procedure time) personally providing and directing critical care services at the bedside and on the critical care unit for this patient.     Campbell Pathak MD

## 2022-10-09 NOTE — PROGRESS NOTES
Soft limb restraints initiated for attempts to pull out ETT/mcpherson.  Unable to verbalize understanding with repeated attempts.  Wife and daughter notified and verbalized understanding.  Will discontinue when appropriate.

## 2022-10-09 NOTE — PLAN OF CARE
Major Shift Events:  patient arrived to us from OSH around 0200 this am. He was intubated on CMV settings at an FiO2 of 60%. Patient able to follow simple commands and opens eyes to voice. Continuous dilaudid and precedex initiated. BPs soft but stable. Sinus marvin to sinus rhythm with occasional PVCs/PACs. Afebrile. Nur in place and OG placement confirmed.     For vital signs and complete assessments, please see documentation flowsheets.

## 2022-10-09 NOTE — PHARMACY-CONSULT NOTE
Pharmacy Tube Feeding Consult    Medication reviewed for administration by feeding tube and for potential food/drug interactions.    Recommendation: No changes are needed at this time.     Pharmacy will continue to follow as new medications are ordered.    Marvin Marrero, PharmD, BCPS

## 2022-10-09 NOTE — PROGRESS NOTES
SURGICAL ICU PROGRESS NOTE  October 9, 2022      CO-MORBIDITIES:   No diagnosis found.    ASSESSMENT: Zachary Gan is a 82 year old male with hx of DVT, PE on apixiban, PVD, HTN,  who was transferred from OSH after he required intubation for respiratory distress relating to a thyroid nodule. Likely epiglottis.     TODAY'S PROGRESS/PLANS:   - Wean pain  - follow-up ENT plans -- zosyn, decadron, possible cuff leak, TFTs  - follow-up afternoon labs  - PS on vent  - TFs    PLAN:    Neurological:  - Monitor neurological status. Delirium preventions and precautions.   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  # Severe stenosis of glottic airway   # 11 mm soft tisue nodule in superficial left frontal lobe  # Tracheal deviation  - For suspected supraglottic pathology:               - IV zosyn               - Decadron 8-10 q8h for 24 hours               - Will evaluate for a cuff leak in 24 hours and then can consider extubation               - Will be able to obtain more thorough airway exam once extubated   - For thyroid mass               - Follow up TSH and reflex T4     Pulmonary:   # Acute hypoxic respiratory support after upper airway obstruction  - VENT:   Vent Mode: CMV/AC  (Continuous Mandatory Ventilation/ Assist Control)  FiO2 (%): 60 %  Resp Rate (Set): 16 breaths/min  Tidal Volume (Set, mL): 500 mL  PEEP (cm H2O): 5 cmH2O  Resp: 15  --> pressure control fi 50% rr 20 peep 5 tv 450 today  # Hx PE on apixiban (11/6/2017)  - Hold apxiban  - Monitor, no new PE or residual PE on CT (10/9)  # Small b/l pleural effusions (cxr 10/9)  - monitor with cxr    Cardiovascular:    # ?chronic heart failure  -- MLEODY 2/15/22 - EF 55-60  # PVD  # ? CAD  # HTN  - Pta: asa 81, atorvostatin 40 - med rec, hold for now  - monitor hemodynamics, not on pressors  - full pharm med  rec    Gastroenterology/Nutrition:  - npo  - NGT in place  - start feeds today, nutrition consult    Fluids/Electrolytes:  # lactic acidosis   - 3.3 on admission, recheck this afternoon   - discontinue fluids     Renal:  - creatinine today 1.03. BL creat ~1.0-1.1  - mcpherson, monitor UOP - adequate    Endocrine:  # DM2 controlled with diet  - start sliding scale insulin in setting of acute stress  - Goal to keep BG< 180  - has not needed but keep for now     ID:  - wbc 13.3  - no indication of acute infection  - sputum sample    Heme:     # Hx DVT of RUE unspecified time  # Chronic disease anemia   - hgb 12.1 BL ~11  - INR 1.23, PTT 33, Fibrinogen 624  - Transfuse if hgb <7.0 or signs/symptoms of hypoperfusion. Monitor and trend.      Musculoskeletal/Skin  # Venous stasis dermatitis of B/LE  # Thoracic spondylosis without myelopathy  # age indeterminate compression fx t4  - ntd  - Physical and occupational therapy consult   - WOC consult     General Cares/Prophylaxis:    DVT Prophylaxis: sqh   GI Prophylaxis: PPI  Restraints: Restraints for medical healing needed: NO     Lines/ tubes/ drains:  - PIV x2  - ETT  - NGT    Disposition:  - Surgical ICU    Discussed with sicu staff    Dudley Rolle MD PGY2  General Surgery     ====================================    SUBJECTIVE:   Admitted overnight. No acute events since admission. Doing okay this am, increasing aggitation     OBJECTIVE:   1. VITAL SIGNS:   Temp:  [96.3  F (35.7  C)-96.9  F (36.1  C)] 96.9  F (36.1  C)  Pulse:  [57-74] 57  Resp:  [8-17] 15  BP: ()/(53-74) 95/53  FiO2 (%):  [60 %] 60 %  SpO2:  [95 %-99 %] 98 %  Vent Mode: CMV/AC  (Continuous Mandatory Ventilation/ Assist Control)  FiO2 (%): 60 %  Resp Rate (Set): 16 breaths/min  Tidal Volume (Set, mL): 500 mL  PEEP (cm H2O): 5 cmH2O  Resp: 15      2. INTAKE/ OUTPUT:   I/O last 3 completed shifts:  In: 111.67 [I.V.:111.67]  Out: 550 [Urine:550]    3. PHYSICAL EXAMINATION:   General: sedated,  intubated  HEENT:asymetric neck though no palpable mass   Neuro: lethargic  Pulm/Resp: nonlabored on vent  CV: RRR  Abdomen: Soft, distended, nontender  : mcpherson catheter in place, urine yellow and clear  Incisions/Skin: B/L lower extremity venous stasis   MSK/Extremities: +1-2 peripheral edema, WWP    4. INVESTIGATIONS:   Arterial Blood Gases   No lab results found in last 7 days.  Complete Blood Count   Recent Labs   Lab 10/09/22  0349   WBC 13.3*   HGB 12.1*        Basic Metabolic Panel  Recent Labs   Lab 10/09/22  0752 10/09/22  0445 10/09/22  0159   NA  --  134*  --    POTASSIUM  --  4.6  --    CHLORIDE  --  100  --    CO2  --  25  --    BUN  --  13.7  --    CR  --  1.03  --    GLC 98 125* 153*     Liver Function Tests  Recent Labs   Lab 10/09/22  0445   AST 18   ALT <5*   ALKPHOS 90   BILITOTAL 0.4   ALBUMIN 3.0*   INR 1.23*     Pancreatic Enzymes  Recent Labs   Lab 10/09/22  0445   LIPASE 35   AMYLASE 62     Coagulation Profile  Recent Labs   Lab 10/09/22  0445   INR 1.23*   PTT 33     Lactate  Invalid input(s): LACTATE    5. RADIOLOGY:   Recent Results (from the past 24 hour(s))   XR Chest Port 1 View    Impression    RESIDENT PRELIMINARY INTERPRETATION  IMPRESSION:   1. Endotracheal tube tip projects over the mid trachea approximately 5  cm above the mark.  2. Cardiomegaly with perihilar opacities, likely a combination of  atelectasis and edema.  3. Small bilateral pleural effusions.   XR Abdomen Port 1 View    Impression    RESIDENT PRELIMINARY INTERPRETATION  IMPRESSION:  1. Enteric tube tip and sidehole project over the stomach.  2. Air distended loops of small and large bowel throughout the  visualized abdomen. Differential includes obstruction and ileus.       CT Neck  IMPRESSION:   1. Enlarged multinodular thyroid goiter with substernal extension of the right lower lobe.   There is deviation of the trachea to the left at the thoracic inlet and narrowing of a   transverse dimension of the  trachea to 9 mm.   2. Severe stenosis of the glottic airway which measures 2 mm in minimal transverse diameter. No    soft tissue mass identified.   3. 11 mm homogeneous soft tissue nodule in the superficial left frontal lobe is indeterminate.   This could represent an enlarged lymph node or a primary parotid neoplasm.     CTA PE  IMPRESSION:   1. Respiratory motion artifact limits evaluation of the pulmonary chest system. No central   pulmonary embolism identified.   2. Age-indeterminate severe compression fracture at T4 with associated focal kyphosis. No   significant cortical retropulsion. No paraspinous soft tissue swelling. This can be more   accurately with noncontrast MRI thoracic spine if clinically warranted.   3. Cholelithiasis.   4. Trace left pleural fluid

## 2022-10-09 NOTE — LETTER
10/27/2022    Attention: Nursing at [facility]    Message: Attached are discharge orders and summary for TIERRA Gan. Thanks!    Christy Glynn, RNCC, BSN    [facility]    [address]  [address] [place] [id number]    [email or web address]    Office: [phone] Pager: [phone]    To contact the weekend RNCC  [place] (0800 - 1630) Saturday and Sunday    Units: 4A, 4C, 4E, 5A and 5B- Pager 1: [phone]    Units: 6A, 6B, 6C, 6D- Pager 2: [phone]    Units: 7A, 7B, 7C, 7D, and 5C-Pager 3: [phone]

## 2022-10-09 NOTE — PROGRESS NOTES
D/I: Patient on unit 4A Surgical ICU for mechanical ventilation management of resp distress/thyroid nodule.   Neuro-Extremely agitated with suctioning, cares.  ELBA, good upper extremity strength. Moves lower extremities. Follows commands inconsistently.  CV-Hemodynamically stable.  Generalized edema throughout.  Edema in lower extremities with doppler pulses. Bilateral LE wrapped.  LR 500cc given x1.     Pulm-Coarse bilat. Sputum cx sent,  Thick tan secretions.      GI-Abdomen rounded, OG inplace, not passing gas.  TF ordered to start at 10cc/hr.  -Nur with adequate UO.     Gtts-Propofol at 10mcg/kg/mn.     Skin-LE wrapped/WOC consulted. Sacral mepilex inplace.   Pain-Dilaudid gtt DC'd.  PRN not given.    See flow sheets for further interventions and assessments.  A: Stable. Wife and daughter updated via MD.    P: Continue to monitor pt closely. Notify MD of significant changes.

## 2022-10-09 NOTE — LETTER
10/27/2022    Attention: [facility]    Message: Attached are discharge orders and summary for TIERRA Gan. Thanks!    Christy Glynn, RNCC, BSN    [facility]    [address]  [address] [place] [address]    [email or web address]    Office: [phone] Pager: [phone]    To contact the weekend RNCC  [place] (0800 - 1630) Saturday and Sunday    Units: 4A, 4C, 4E, 5A and 5B- Pager 1: [phone]    Units: 6A, 6B, 6C, 6D- Pager 2: [phone]    Units: 7A, 7B, 7C, 7D, and 5C-Pager 3: [phone]

## 2022-10-09 NOTE — CONSULTS
"Otolaryngology H&P Note  October 9, 2022    HPI: Zachary Gan is a 82 year old male with a past medical history of PE, DVT, peripheral vascular disease, venous stasis ulcers, HTN, HLD, DVT who presents as a transfer from H after he was intubated due to respiratory distress and suspected airway compression from a thyroid nodule seen on CT. History obtained from chart review. He had sore throat for 4 days prior to presentation as well as shortness of breath and cough for the prior 3 days. When he presented to the ED he was in significant respiratory distress and so after imaging was obtained, he was intubated to secure his airway.     No past medical history on file.    No past surgical history on file.    No current outpatient medications on file.      Not on File    Soc Hx:   - nonsmoker    No family history on file.    ROS: 12 point review of systems is negative unless noted in HPI.    PHYSICAL EXAM:  BP 95/53   Pulse 57   Temp 96.9  F (36.1  C) (Axillary)   Resp 15   Ht 1.71 m (5' 7.32\")   Wt 114.9 kg (253 lb 4.9 oz)   SpO2 98%   BMI 39.29 kg/m    General: laying in bed, intubated and sedated  HEAD: normocephalic, atraumatic  Face: symmetrical, no swelling, edema, or erythema, no facial droop. Unable to palpate parotid mass though limited by ETT stickers over cheeks.   Eyes: clear sclera  Ears: normal in external appearance   Nose: no anterior drainage  Mouth: moist mucus membranes, edentulous, difficult to gather full exam with ETT however no obvious mucosal ulcers or masses. Tongue normal in appearance  Oropharynx: unable to visualize  Neck: right neck lateral to trachea feels full. Due to patient's kyphosis and resistance to my exam, neck landmarks are difficult to palpate   Respiratory: Breathing non-labored, no stridor, no accessory muscle use.       ROUTINE IP LABS (Last four results)  [facility]  Recent Labs   Lab 10/09/22  0752 10/09/22  0445 10/09/22  0159   NA  --  134*  --    POTASSIUM  --  4.6 "  --    CHLORIDE  --  100  --    NURA  --  8.3*  --    CO2  --  25  --    BUN  --  13.7  --    CR  --  1.03  --    GLC 98 125* 153*     CBC  Recent Labs   Lab 10/09/22  0349   WBC 13.3*   RBC 4.86   HGB 12.1*   HCT 40.3   MCV 83   MCH 24.9*   MCHC 30.0*   RDW 17.2*        INR  Recent Labs   Lab 10/09/22  0445   INR 1.23*       Imaging: CT Neck  1. Enlarged multinodular thyroid goiter with substernal extension of the right lower lobe.   There is deviation of the trachea to the left at the thoracic inlet and narrowing of a   transverse dimension of the trachea to 9 mm.   2. Severe stenosis of the glottic airway which measures 2 mm in minimal transverse diameter. No    soft tissue mass identified.   3. 11 mm homogeneous soft tissue nodule in the superficial left frontal lobe is indeterminate.   This could represent an enlarged lymph node or a primary parotid neoplasm.      Assessment and Plan  Zachary Gan is a 82 year old male with acute respiratory distress requiring intubation at OSH and transfer to the [place]. Upon review of imaging, his thyroid mass is large, however it is not compressing the trachea enough to produce his level of airway symptoms. This goiter has also likely been present for many years and so does not explain the acute change in symptoms. Given his presenting symptom of sore throat, it is possible that he has a supraglottitis type picture though difficult to fully evaluate this on imaging.     Neuro:  - Pain control and sedation: per SICU     HEENT:  - For suspected supraglottic pathology:   - IV zosyn   - Decadron 8-10 q8h for 24 hours   - Will evaluate for a cuff leak in 24 hours and then can consider extubation   - Will be able to obtain more thorough airway exam once extubated   - For thyroid mass   - Follow up TSH and reflex T4    Respiratory:  - Continue mechanical ventilation   - Holding apixiban per SICU     CV/heme:  - Holding home meds currently     FEN/GI:  - NPO   - NG  tube in place  - Bowel regimen    :  - Nur in place    Endo  - sliding scale insulin     Heme:     ID:  - IV zosyn    PPX:  - Holding DVT ppx  - PPI  - SCDs  - IS    Consults: PT, OT, WOC (venous stasis dermatitis)    Patient and plan discussed with chief resident and staff Dr. Carlos Nava MD  Otolaryngology-Head & Neck Surgery, PGY2    Attending attestation:    I, Carlos Gonzalez MD, saw this patient and agree with the resident's findings and plan of care as documented in the resident's/fellow's note.    Presentation of acute respiratory failur not c/w airway obstruction from thyroid goiter. Suspect supraglottitis (christi given history of several days of sore throat prior to resp failure) or other infectious/inflammatory process as a major contributor.     Agree with plan as above: abx, steroids, assessing for extubation, laryngoscopy when awake.  Will also discuss management of thyroid goiter with head and neck colleagues.     Carlos Gonzalez MD    [facility]  [facility]  [facility]  Department of Otolaryngology - Head & Neck Surgery

## 2022-10-09 NOTE — PROGRESS NOTES
CLINICAL NUTRITION SERVICES - ASSESSMENT NOTE     Nutrition Prescription    RECOMMENDATIONS FOR MDs/PROVIDERS TO ORDER:  - Would monitor GI function closely given abdominal xray findings 10/9 with further TF rate advance.  Please notify RD when okay to advance rate.   - No information re: po intake PTA so some risk of refeeding.  BMP, Mg and Phos labs ordered for next 2 days.  Consider electrolyte replacement protocols as appropriate    Malnutrition Status:    Patient does not meet two of the established criteria necessary for diagnosing malnutrition but is at risk for malnutrition    Recommendations already ordered by Registered Dietitian (RD):  --GOAL: Osmolite 1.5--trickle feed--10 ml/hr  ---Do not start or advance TF rate unless K+ >3.0, Mg++ > 1.5,  and Phos > 1.9.  --Minimum 30 ml q 4 hrs water flushes for tube patency.  --If gastric enteral access: HOB > 30 degrees   --MVI/minerals supplement if not already ordered (Certavite q day)  --Ordered BMP, Phos and Mg ++ labs for 10/10 and 10/11.     Future/Additional Recommendations:  --Follow GI function with potential ileus per abd xray, TF tolerance.   -- when appropriate to advance TF rate beyond 10 ml/hr, would recommend  --Osmolite 1.5 increase to 20 ml/hr then by 10 ml q 8 hr to goal of 50 ml/hr + 2 pckt Prosource 2x/d.  Will provide  (1200ml formula), 1960 kcals (23 kcal/kg), 119 g PRO (1.4 gm Pro/kg), 914 ml free H20, 244 g CHO, and 0 g fiber daily.  - Do not advance TF rate unless K+ >3.0, Mg++ > 1.5,  and Phos > 1.9.       Spoke with ICU resident re: xray and distended small and large bowel.  He stated ileus was not suspected and okay to start TF. Also no concerns re: MAP/gut perfusion.   At present staff has been unable to reach family.      REASON FOR ASSESSMENT  Zachary Gan is a/an 82 year old male assessed by the dietitian for Provider Order - Registered Dietitian to Assess and Order TF per Medical Nutrition Therapy Protocol    Chart reviewed.  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 Per [facility] records also has CHF w/ preserved EF, anemia of chronic disease.     Noted OG placement confirmed. However per XR--\"Air distended loops of small and large bowel throughout the visualized abdomen. Small bowel loops in the central abdomen measure up to 3.7 cm in diameter.\"    NUTRITION HISTORY  Per [facility] records, goes by \"Doug.\"   Unable to reach daughter via phone, although RN spoke with her earlier. Inpt RD consulted to see pt 2/10/22 during admission for diet instruction on low sodium for CHF.  At the time pt was upset about not getting enough to eat with his diet restrictions and stated he was unaware of his CHF dx and \"he does not watch his sodium intake and doesn't know why he would.\"  No information available about how pt was eating in the days prior to admission. Had negative ketones with UA on 7/7 when admitted to [facility].    CURRENT NUTRITION ORDERS  Diet: NPO    No maintenance IVF ordered but does have IVF running @ [facility] along with scheduled IV Rx.  Received 500 ml bolus this PM.    LABS  Labs reviewed  10/9--lactic acid 3.3 to 2.5 not updated since 1pm bolus.   Na 134, K+ 4.6, Mg 2.0, Phos 3.0.     MEDICATIONS  Medications reviewed  Propofol- range of 3.4-51.7 ml/hr  IV decadron q 8 hr x 3 doses--first this AM.  novolog q 4 hr.  IV protonix, IV zosyn.    GI per 10/9 xray: Enteric tube tip and sidehole project over the stomach.  Air distended loops of small and large bowel throughout the visualized abdomen. Differential includes obstruction and ileus. (Small bowel loops in the central  abdomen measure up to 3.7 cm in diameter.)  GT currently clamped.  RN noted some green bile in tubing. Plans to check G tube residuals or put to LIS briefly to r/o " "delayed gastric emptying before starting TF.     ANTHROPOMETRICS  Height: 171 cm (5' 7.323\")  Noted ht listed as 5'11\" with 2/9/22 [facility] admission, 6'0\" from [facility] records for 2021.  Will use 5'11\" as unclear where 171 cm ht came from and pt was intubated upon arrival.   Most Recent Weight: 114.9 kg (253 lb 4.9 oz)    IBW: 78.2 kg (147% IBW using 5'11\")  BMI: 35.6 using 5'11\"  Weight History:   Wt Readings from Last 10 Encounters:   10/09/22 114.9 kg (253 lb 4.9 oz)   02/10/22  114.1 kg (251 lb 9.6 oz) per [facility] records      12/19/21 117.7 kg (259 lb 8 oz)    11/06/21 118 kg (260 lb 1.6 oz)        Dosing Weight: 87 kg (based on 10/9 wt of 115 kg and IBW of 78 kg)    ASSESSED NUTRITION NEEDS  Estimated Energy Needs: 1740 - 2175  kcals/day (20 - 25 kcals/kg)  Justification: Obese  Estimated Protein Needs: 113 - 131 - 174 grams protein/day (1.3 - 1.5 - 2 grams of pro/kg)--lower end if no surgical intervention while in ICU.  Justification: Increased needs and Obesity guidelines--anticipate future surgery  Estimated Fluid Needs: (1 mL/kcal)   Justification: Maintenance and Per provider pending fluid status    PHYSICAL FINDINGS  See malnutrition section below.  Pt sleeping, intubated with chin against his chest.  Earlier RN couldn't get pt to adjust his neck and pt became agitated when attempting to place rolled towel under his chin.      MALNUTRITION  % Intake: Unable to assess  % Weight Loss: None noted  Subcutaneous Fat Loss: None observed  Muscle Loss: None observed  Fluid Accumulation/Edema: Mild  Malnutrition Diagnosis: Patient does not meet two of the established criteria necessary for diagnosing malnutrition but is at risk for malnutrition    NUTRITION DIAGNOSIS  Swallowing difficulty related to intubation as evidenced by OG placement and plan for trickle rate of enteral feeding      INTERVENTIONS  Implementation  Nutrition Education: Not appropriate at this time due to patient condition   Enteral Nutrition - " Initiate--trickle  Feeding tube flush  Multivitamin/mineral supplement therapy     Goals  Total avg nutritional intake to meet a minimum of 20 kcal/kg and 1.5 g PRO/kg daily (per dosing wt 87 kg).     Monitoring/Evaluation  Progress toward goals will be monitored and evaluated per protocol.    Rowena Mcintosh RD, DUANE   RD Weekend/Holiday Pager: [phone]

## 2022-10-09 NOTE — PLAN OF CARE
Major Shift Events:      No acute events in afternoon; remains on restraints for attempts to pull tubes. Propofol infusion increased d/t increased restlessness. Trickle tube feeds started.    For vital signs and complete assessments, please see documentation flowsheets.     Problem: Restraint, Nonviolent  Goal: Absence of Harm or Injury  Intervention: Protect Dignity, Rights and Personal Wellbeing  Recent Flowsheet Documentation  Taken 10/9/2022 1600 by Slim Sotelo, RN  Trust Relationship/Rapport:   care explained   emotional support provided   reassurance provided

## 2022-10-09 NOTE — PLAN OF CARE
Goal Outcome Evaluation:      Plan of Care Reviewed With: other (see comments) (pt intubated, unable to reach family)    Overall Patient Progress: improvingOverall Patient Progress: improving    Outcome Evaluation: To start TF per RD recommendations.

## 2022-10-10 NOTE — PLAN OF CARE
Major Shift Events: Extubated to RA at 0915. Coughing up thick flem. Was mildy confused this morning after extubation and making statements about people entering his room trying to harm him. He is now oriented x3-4 but need reminders to complete tasks. Sinus 60s-90s with frequent PVCs and PACs. MAP > 65 without intervention. Nur removed at 1630, due to void, primofit on.    Plan: Transfer to  pending bed transfer.   For vital signs and complete assessments, please see documentation flowsheets.

## 2022-10-10 NOTE — PROGRESS NOTES
"   10/10/22 1416   Appointment Info   Signing Clinician's Name / Credentials (SLP) Carlos Mercedes MA [facility] SLP   General Information   Onset of Illness/Injury or Date of Surgery 10/05/22   Referring Physician Dudley Rolle MD   Patient/Family Therapy Goal Statement (SLP) None stated   Pertinent History of Current Problem Per provider notes: \"Zachary Gan is a 82 year old male with a past medical history of PE, DVT, PVD, venous stasis ulcers, HTN, and HLD presenting as a transfer from [facility] after he was intubated in the setting of respiratory distress following a four day course of cough and sore throat. There was initial concern regarding airway compression from a thyroid nodule seen on CT scan but this was felt to be noncontributory to acute presentation. Laryngoscopy reveals boggy arytenoid edema that is non-obstructive, contact ulcer at the right vocal process and edema/granuloma formation at the left vocal process. These vocal cord changes are likely related to contact irritation from the endotracheal tube. No acute upper airway obstruction.\" Patient was intubated from 10/9/22 AM to 10/10/22 AM. Per ENT Endoscopy note today \"The larynx was clear, there are b/l irregularities at the vocal process, right cord appears notched out/ulcerated and symmetric position on the left cord with a small out pouching growth. There is adequate opening between the cords and a larger posterior glottic airway. The cords are mobile on phonation.  The arytenoids were boggy and edematous, but not obstructing the glottis. There is pooling of clear secretions that is visibly aspirated and unable to completely clear with cough.\"   General Observations Patient sitting upright in bed, confused but able to participate.   Type of Evaluation   Type of Evaluation Swallow Evaluation   Oral Motor   Oral Musculature generally intact   Dentition (Oral Motor)   Dentition (Oral Motor) edentulous   Facial Symmetry (Oral Motor)   Facial Symmetry " (Oral Motor) WNL   Lip Function (Oral Motor)   Lip Range of Motion (Oral Motor) WNL   Tongue Function (Oral Motor)   Tongue Coordination/Speed (Oral Motor) WNL   Tongue ROM (Oral Motor) WNL   Cough/Swallow/Gag Reflex (Oral Motor)   Soft Palate/Velum (Oral Motor) WNL   Volitional Throat Clear/Cough (Oral Motor) reduced strength   Volitional Swallow (Oral Motor) WNL   Vocal Quality/Secretion Management (Oral Motor)   Vocal Quality (Oral Motor) hoarse   Secretion Management (Oral Motor) WNL;other (see comments)  (Expectorating white/clear phlegm x2 during session.)   General Swallowing Observations   Past History of Dysphagia None prior to most recent hospitalization.   Respiratory Support (General Swallowing Observations) nasal cannula   Current Diet/Method of Nutritional Intake (General Swallowing Observations, NIS) NPO;other (see comments)  (until SLP eval complete)   Swallowing Evaluation Clinical swallow evaluation   Clinical Swallow Evaluation   Feeding Assistance frequent cues/help required   Clinical Swallow Evaluation Textures Trialed thin liquids;mildly thick liquids;pureed;soft & bite-sized   Clinical Swallow Eval: Thin Liquid Texture Trial   Mode of Presentation, Thin Liquids cup;self-fed   Volume of Liquid or Food Presented >4 oz thin water   Oral Phase of Swallow WFL   Pharyngeal Phase of Swallow coughing/choking   Diagnostic Statement Noted x1 after trial, patient did cough and expectorate phlegm, however no other overt s/s of aspiration observed.   Clinical Swallow Eval: Mildly Thick Liquids   Mode of Presentation spoon   Volume Presented 3 cup sips   Oral Phase WFL   Pharyngeal Phase intact   Diagnostic Statement No overt s/s of aspiration observed.   Clinical Swallow Evaluation: Puree Solid Texture Trial   Mode of Presentation, Puree spoon;fed by clinician   Volume of Puree Presented x3 tsps   Oral Phase, Puree WFL   Pharyngeal Phase, Puree intact   Diagnostic Statement No overt s/s of aspiration,  good oral clearance   Clinical Swallow Eval: Soft & Bite Sized   Mode of Presentation spoon;fed by clinician   Volume Presented x2 tsps   Oral Phase other (see comments)  (extended, but functional  bolus breakdown)   Pharyngeal Phase intact   Diagnostic Statement Extended mastication considering edentulous state, but functional breakdown and clearance. Patient prefers solids even without dentures.   Esophageal Phase of Swallow   Patient reports or presents with symptoms of esophageal dysphagia No   Swallowing Recommendations   Diet Consistency Recommendations soft & bite-sized (level 6);thin liquids (level 0)   Supervision Level for Intake close supervision needed   Mode of Delivery Recommendations bolus size, small;slow rate of intake   Swallowing Maneuver Recommendations alternate food and liquid intake   Monitoring/Assistance Required (Eating/Swallowing) check mouth frequently for oral residue/pocketing;stop eating activities when fatigue is present;monitor for cough or change in vocal quality with intake   Recommended Feeding/Eating Techniques (Swallow Eval) maintain upright sitting position for eating;provide oral hygiene prior to intake;provide assist with feeding   Medication Administration Recommendations, Swallowing (SLP) As tolerated   Instrumental Assessment Recommendations   (pending progress)   General Therapy Interventions   Planned Therapy Interventions Dysphagia Treatment   Dysphagia treatment Instruction of safe swallow strategies;Modified diet education   Clinical Impression   Criteria for Skilled Therapeutic Interventions Met (SLP Eval) Yes, treatment indicated   SLP Diagnosis Mild oropharyngeal dysphagia   Risks & Benefits of therapy have been explained evaluation/treatment results reviewed;care plan/treatment goals reviewed;risks/benefits reviewed;current/potential barriers reviewed;participants included;patient   Clinical Impression Comments   Clinical swallow evaluation completed per provider  orders. Patient demonstrates mild oropharyngeal dysphagia. Noted hoarse vocal quality throughout assessment, see ENT endoscopy note for details. Patient completed trials of thin & mildly thick liquids and puree and soft & bite size textures. patient did have coughing episode x1 delayed after thin liquid trial, resulting in expectoration of phlegm. No other overt s/s of aspiration across remainder of evaluation. Patient oral phase c/b good bolus acceptance and closure, extended mastication of soft and bite size textures, but functional breakdown and clearance. Pharyngeal phase demonstrates suspected weakness, but functional. Patient preference is for soft and bite size solids, but can self-select softer solids with help of nursing.     Recommend soft and bite size textures with thin liquids diet. Patient should be fully upright and alert for all intake, oral cares before PO intake, alternate liquids and solids, small bites/sips, and slow rate of intake. If patient can have dentures brought in, may help PO intake. Patient will self-select softer solids with nursing help. SLP will continue to follow for dysphagia management.     SLP Total Evaluation Time   Eval: oral/pharyngeal swallow function, clinical swallow Minutes (85950) 16   SLP Discharge Planning   SLP Plan PO tolerance, potential downgrade if patient struggling. Safe swallow strategies.   SLP Discharge Recommendation home with home care speech therapy   SLP Rationale for DC Rec swallow function below baseline.   SLP Brief overview of current status  Recommend soft and bite size textures with thin liquids diet. Patient should be fully upright and alert for all intake, oral cares before PO intake, alternate liquids and solids, small bites/sips, and slow rate of intake. If patient can have dentures brought in, may help PO intake. Patient will self-select softer solids with nursing help. SLP will continue to follow for dysphagia management.   Total Session Time    Total Session Time (sum of timed and untimed services) 16

## 2022-10-10 NOTE — PHARMACY-ADMISSION MEDICATION HISTORY
Admission Medication History Completed by Pharmacy    See [facility] Admission Navigator for allergy information, preferred outpatient pharmacy, prior to admission medications and immunization status.     Medication History Sources:     [facility] ([phone])    Changes made to PTA medication list (reason):    Added:     Acetaminophen    Apixaban    Atorvastatin    Furosmide    Deleted: None    Changed: None    Additional Information:    All medications and doses were given by assisted living nursing line.    Prior to Admission medications    Medication Sig Last Dose Taking? Auth Provider Long Term End Date   acetaminophen (TYLENOL) 500 MG tablet Take 1 tablet (500 mg) by mouth 2 times daily 10/8/2022 Yes Unknown, Entered By History     apixaban ANTICOAGULANT (ELIQUIS) 5 MG tablet Take 1 tablet (5 mg) by mouth 2 times daily 10/8/2022 Yes Unknown, Entered By History     atorvastatin (LIPITOR) 40 MG tablet Take 1 tablet (40 mg) by mouth At Bedtime 10/8/2022 Yes Unknown, Entered By History Yes    furosemide (LASIX) 20 MG tablet Take 1 tablet (20 mg) by mouth every morning 10/8/2022 Yes Unknown, Entered By History Yes        Date completed: 10/10/22    Medication history completed by: Dexter Smith

## 2022-10-10 NOTE — PROGRESS NOTES
Pt placed on CPAP/PS 7/5 40% Wean by MD, unknow time of wean.   Pt was Exp @ 0915 and placed on 3l/m NC. SATs 95%.  Pt has good cough, some audible stridor with pt's obstruction.   Pt states he is breathing well.    RT will follow.

## 2022-10-10 NOTE — PROGRESS NOTES
"Otolaryngology Progress Note    S: NAEO.    O: /58   Pulse 53   Temp 97.5  F (36.4  C) (Axillary)   Resp 20   Ht 1.803 m (5' 10.98\")   Wt 114.9 kg (253 lb 4.9 oz)   SpO2 94%   BMI 35.35 kg/m    General: resting comfortably  Pulm: does have a cuff leak    CBC  Recent Labs   Lab 10/10/22  0443 10/09/22  0349   WBC 8.9 13.3*   RBC 4.09* 4.86   HGB 10.0* 12.1*   HCT 32.1* 40.3   MCV 79 83   MCH 24.4* 24.9*   MCHC 31.2* 30.0*   RDW 17.5* 17.2*   PLT  --  188     INR  Recent Labs   Lab 10/09/22  0445   INR 1.23*       A/P: Bagley C Malik is a 82 year old male with a past medical history of PE, DVT, PVD, venous stasis ulcers, HTN, and HLD presenting as a transfer from OSH after he was intubated in the setting of respiratory distress following a four day course of cough and sore throat. There was initial concern regarding airway compression from a thyroid nodule seen on CT scan but this was felt to be noncontributory to acute presentation. May be more of a supraglottitis picture.    - continue IV abx  - ok for extubation per usual criteria  - ENT will flex scope after extubation  - medicine team when transfers out of ICU      -- Patient and above plan discussed with MD Zachary Britton MD    "

## 2022-10-10 NOTE — PLAN OF CARE
Major Shift Events:  Remains heavily sedated on propofol although this was decreased overnight from 40mcg/kg/min to 25mcg/kg/min. Moves all extremities but does not follow commands. PEGGY. Bradycardic with stable BPs. Afebrile. PC-AC vent settings at 40% FiO2, PEEP 5, RR 20. LS clear. Minimal secretions through ETT. Trickle feeds running at 10ml/hr. Nur in place. No bowel movements this shift.     Labs reviewed.     Plan: continue with IV abx and extubate per protocol     For vital signs and complete assessments, please see documentation flowsheets.

## 2022-10-10 NOTE — PLAN OF CARE
Talked to Alfreditoadriana (sister) this morning to give an update that the patient had been extubated. She discussed the patients baseline mental status/physical capabilities prior to this hospitalization. She reported the patient has some mild baseline confusion. He is unable to ambulate further than a few feet and uses a wheelchair. He requires assist from staff at living facility for bathing and transferring to wheelchair.

## 2022-10-10 NOTE — CONSULTS
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 Likely epiglottis given 4 days of coughing and sore throat prior to presentation to OSH.      Areas Assessed:      Areas visualized during today's visit: Focused: and Lower extremities   Bilaterally: PP 2/4, appropriately warm from toes to knees, minimal to 1+ pitting edema, dark purple/brown hemosiderin staining from ankles to knees.      Wound location: RLE      Last photo: 10/10/22  Wound due to: Venous Ulcer  Wound history/plan of care: pt believes wounds due to ACE wraps.  Currently with adaptic, roll gauze and ACE wraps.  Pt is adamant about keeping any compression off  Wounds are venous stasis ulcers     Wound base: mixture of dermis and fibrin.  7 x 9 cm band of wounds on anterior gator area.  Scattered dry fibrinous scabs scattered on remaining LE       Palpation of the wound bed: normal      Drainage: small     Description of drainage: serosanguinous    Odor: mild  Pain: mild,     Wound location: RLE      Last photo: 10/10/22  Wound due to: per pt, wound \"from rubbing on the bed\"   Wound is on posterior LE, immediately superior to achilles   Wound history/plan of care: currently with adaptic, kerlix and ACE wrap  Wound base: 100 % dermis, moist and pink    Palpation of the wound bed: normal      Drainage: scant     Description of drainage: serous     Measurements (length x width x depth, in cm): 4.2  x 1.4  x  0.2 cm     Periwound skin: Erythema- blanchable      Odor: mild  Pain: mild,   Pain interventions prior to dressing change: N/A  Treatment goal: Heal   STATUS: initial assessment  Supplies ordered: gathered and supplies stored on unit     Treatment " Plan:     Bilateral LE wounds: daily  Cleanse wounds and skin with wound cleanser and dry gauze.  Pat dry   Cover intact skin with sween 24 if pt allows, or vaseline if he c/o burning   Cover wounds with vaseline gauze.   Cover vaseline gauze with appropriate amt of dry gauze or ABD pads-enough to contain drainage for 24 hours.   Secure with kerlix roll gauze.        Orders: Reviewed and Written    RECOMMEND PRIMARY TEAM ORDER: Lymphedema consult  Education provided: plan of care, wound progress and need for compression   Discussed plan of care with: Patient and Nurse  WOC nurse follow-up plan: weekly  Notify WOC if wound(s) deteriorate.  Nursing to notify the Provider(s) and re-consult the WOC Nurse if new skin concern.    DATA:     Current support surface: Standard  Low air loss mattress  BMI: Body mass index is 35.35 kg/m .   Active diet order: Orders Placed This Encounter      Soft & Bite Sized Diet (level 6) Thin Liquids (level 0)     Output: I/O last 3 completed shifts:  In: 1018.9 [I.V.:858.9; NG/GT:30]  Out: 1860 [Urine:1860]     Labs: Recent Labs   Lab 10/10/22  0443 10/09/22  0445   ALBUMIN  --  3.0*   HGB 10.0*  --    INR  --  1.23*   WBC 8.9  --      Pressure injury risk assessment:   Sensory Perception: 2-->very limited  Moisture: 3-->occasionally moist  Activity: 2-->chairfast  Mobility: 2-->very limited  Nutrition: 2-->probably inadequate  Friction and Shear: 1-->problem  Costa Score: 12    [facility]  Phone: [phone]  Pager: [phone]

## 2022-10-10 NOTE — PROGRESS NOTES
"          SURGICAL ICU PROGRESS NOTE  October 10, 2022      Attending attestation:  Staff Summary  10/10/2022     height is 1.803 m (5' 10.98\") and weight is 114.9 kg (253 lb 4.9 oz). His axillary temperature is 98.5  F (36.9  C). His blood pressure is 120/90 (abnormal) and his pulse is 96. His respiration is 28 and oxygen saturation is 95%.     Intake/Output Summary (Last 24 hours) at 10/10/2022 1431  Last data filed at 10/10/2022 1300  Gross per 24 hour   Intake 1008.9 ml   Output 1410 ml   Net -401.1 ml       Patient was critically ill.  The patient is ready for transfer to floor status.    Florecita Leon MD  [phone] (pager)              ASSESSMENT: Zachary Gan is a 82 year old male with hx of DVT, PE on apixiban, PVD, HTN, who was transferred from OSH after he required intubation for respiratory distress relating to a thyroid nodule. Likely epiglottis given 4 days of coughing and sore throat prior to presentation to OSH.      Interval Events:   - Prop increased for agitation   - TF started   - Pt has cuff leak     TODAY'S PROGRESS/PLANS:   - Stopped propofol   - extubation  - ENT flex scope after extuation  - Plan to restart pta apixaban   - Discontinue zosyn, start ceftriaxone for possible epiglottitis   - Discontinue GI ppx  - transfer to floor (medicine vs ent)    PLAN:    Neurological:  - Monitor neurological status. Delirium preventions and precautions.   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  # Severe stenosis of glottic airway   # 11 mm soft tisue nodule in superficial left frontal lobe  # Tracheal deviation  # possible Epiglottits -- noted none on endoscopy  - For suspected supraglottic pathology:   - 1 day of IV Zosyn (10/09), 6 days of IV ceftriaxone (start 10/10)  - post extubation IV Decadron 8 for 24 hours  - humidity to supplement o2  - " steroid - flovent inhaler  - voice rest 1x  - clinic follow-up 2-4 weeks  - For thyroid mass TSH 0.28, T4 1.07     Pulmonary:   # Acute hypoxic respiratory support after upper airway obstruction  VENT:   - extubated 10/10  # Hx PE on apixiban (11/6/2017)  - Restarting pta apixaban (5 mg BID)   - Monitor, no new PE or residual PE on CT (10/9)  # Small b/l pleural effusions (cxr 10/9)  - monitor with cxr    Cardiovascular:    # CHF w/ preserved EF  -- MELODY 2/15/22 - EF 55-60  - PTA furosemide 20 mg daily - hold for now  # PVD  # Peripheral Artery Disease   # HTN  BLE Stents in peripheral arteries (2017)  - 10/10: BP ~ 110s/50s; HR ~50  - ? Asa, no asa on med rec  - monitor hemodynamics, not on pressors  - full pharm med rec pending, assisted living closed on day of admission     Gastroenterology/Nutrition:  - NPO  - OGT in place, FWF 30 q4h  - Cont trickle feeds @ 10 mL/hr   - Don't advance unless K+ >3.0, Mg++ > 1.5,  and Phos > 1.9  - Nutrition consulted  - Discontinue PPI w/ extubation    Fluids/Electrolytes:    Intake/Output:    Yesterday: 1.4 L in/ 1.2 L out (UOP: 0.44 ml/kg/hr)   This AM: 0.5 L in/ 0.5 out (UOP: 510)    # Lactic acidosis   - Lactate 1.8 on 10/10, down from 3.3 on admission  - start mivf - can discontinue once taking po    Renal:  - creatinine 1.18 (10/10)  BL creat ~1.0-1.1  - mcpherson, monitor UOP - adequate    Endocrine:  Recent Labs   Lab 10/10/22  0443 10/10/22  0403 10/10/22  0027 10/09/22  2034 10/09/22  1859 10/09/22  1733   * 158* 130* 128* 128* 115*   BG Range: 98 - 158; Goal < 180    # DM2 controlled with diet  - start sliding scale insulin in setting of acute stress  - Goal to keep BG< 180     ID:  - WBC 8.9 from 13.3 yesterday  - sputum sample (10/09): mixed faustina w/ PMNs > 25/hpf  - Follow up on MRSA Nasal swab     Heme:     # Hx of RUE DVT (2019)   # Hx of Bilateral LE DVT (unspecified time)  # Chronic disease anemia   - hgb 10.0 from 12.1   - Baseline ~11  - INR 1.23, PTT 33,  Fibrinogen 624  - Transfuse if hgb <7.0 or signs/symptoms of hypoperfusion. Monitor and trend.      Musculoskeletal/Skin  # Venous stasis dermatitis of B/LE  # Thoracic spondylosis without myelopathy  # age indeterminate compression fx t4  - Family noted that patient primarily uses a wheelchair, unable to ambulate more than a few feet  - Physical and occupational therapy consult   - WOC consulted      General Cares/Prophylaxis:    DVT Prophylaxis: subQ Heparin >> plan to switch apixaban   GI Prophylaxis: discontinue pantoprazole (10/10)  Restraints: not indicated      Lines/ tubes/ drains:  - PIV x2  - Removed:  ETT & OGT     Disposition:  - Surgical ICU, plan to transfer to [facility] floor     Discussed and seen with staff    Alie Bull, MS4  [employer]     I agree with the documentation provided by the medical student above and was present for the history and physical exam. I made updates or corrections as appropriate.     Dudley Rolle MD PGY2  General Surgery      ====================================    SUBJECTIVE:   Patient was extubated during rounds this morning. Mildly confused, believed one of his nurses was trying to harm him. Can make needs known.     OBJECTIVE:   1. VITAL SIGNS:   Temp:  [96.9  F (36.1  C)-99.3  F (37.4  C)] 97.5  F (36.4  C)  Pulse:  [49-87] 49  Resp:  [6-24] 20  BP: ()/(42-71) 111/57  FiO2 (%):  [40 %] 40 %  SpO2:  [92 %-98 %] 93 %  Vent Mode: PCV Plus assist  (Pressure Control Ventilation/ Assist Control)  FiO2 (%): 40 %  Resp Rate (Set): 20 breaths/min  Tidal Volume (Set, mL): 500 mL  PEEP (cm H2O): 5 cmH2O  Inspiratory Pressure (cm H2O) (Drager Gabriela): 20  Resp: 20      2. INTAKE/ OUTPUT:   I/O last 3 completed shifts:  In: 1391.1 [I.V.:851.1; IV Piggyback:500]  Out: 1220 [Urine:1220]    3. PHYSICAL EXAMINATION:   General: mildly confused, expresses needs  HEENT:asymetric neck though no palpable mass   Neuro: lethargic  Pulm/Resp: extubated, slightly  increased WOB on NC  CV: RRR  Abdomen: Soft, distended, nontender  : mcpherson catheter in place, urine yellow and clear  Incisions/Skin: B/L lower extremity venous stasis   MSK/Extremities: +1-2 peripheral edema, WWP    4. INVESTIGATIONS:   Arterial Blood Gases   No lab results found in last 7 days.  Complete Blood Count   Recent Labs   Lab 10/10/22  0443 10/09/22  0349   WBC 8.9 13.3*   HGB 10.0* 12.1*    188     Basic Metabolic Panel  Recent Labs   Lab 10/10/22  0443 10/10/22  0403 10/10/22  0027 10/09/22  2034 10/09/22  1859 10/09/22  0752 10/09/22  0445     --   --   --  136  --  134*   POTASSIUM 4.3  --   --   --  4.5  --  4.6   CHLORIDE 105  --   --   --  104  --  100   CO2 22  --   --   --  25  --  25   BUN 22.8  --   --   --  19.4  --  13.7   CR 1.18*  --   --   --  1.16  --  1.03   * 158* 130* 128* 128*   < > 125*    < > = values in this interval not displayed.     Liver Function Tests  Recent Labs   Lab 10/09/22  0445   AST 18   ALT <5*   ALKPHOS 90   BILITOTAL 0.4   ALBUMIN 3.0*   INR 1.23*     Pancreatic Enzymes  Recent Labs   Lab 10/09/22  0445   LIPASE 35   AMYLASE 62     Coagulation Profile  Recent Labs   Lab 10/09/22  0445   INR 1.23*   PTT 33     Lactate  Invalid input(s): LACTATE    5. RADIOLOGY:   Recent Results (from the past 24 hour(s))   XR Chest Port 1 View    Impression    RESIDENT PRELIMINARY INTERPRETATION  IMPRESSION:   1. Endotracheal tube tip projects over the mid trachea approximately 5  cm above the mark.  2. Cardiomegaly with perihilar opacities, likely a combination of  atelectasis and edema.  3. Small bilateral pleural effusions.   XR Abdomen Port 1 View    Impression    RESIDENT PRELIMINARY INTERPRETATION  IMPRESSION:  1. Enteric tube tip and sidehole project over the stomach.  2. Air distended loops of small and large bowel throughout the  visualized abdomen. Differential includes obstruction and ileus.       CT Neck  IMPRESSION:   1. Enlarged multinodular  thyroid goiter with substernal extension of the right lower lobe.   There is deviation of the trachea to the left at the thoracic inlet and narrowing of a   transverse dimension of the trachea to 9 mm.   2. Severe stenosis of the glottic airway which measures 2 mm in minimal transverse diameter. No    soft tissue mass identified.   3. 11 mm homogeneous soft tissue nodule in the superficial left frontal lobe is indeterminate.   This could represent an enlarged lymph node or a primary parotid neoplasm.     CTA PE  IMPRESSION:   1. Respiratory motion artifact limits evaluation of the pulmonary chest system. No central   pulmonary embolism identified.   2. Age-indeterminate severe compression fracture at T4 with associated focal kyphosis. No   significant cortical retropulsion. No paraspinous soft tissue swelling. This can be more   accurately with noncontrast MRI thoracic spine if clinically warranted.   3. Cholelithiasis.   4. Trace left pleural fluid

## 2022-10-10 NOTE — PROGRESS NOTES
"Otolaryngology Progress note  10/10/22    S: Patient extubated to NC. He denies difficulty breathing. Confused and claiming people are trying to kill him. Reports some intermittent voice changes that come and go over the past couple of months since his last hospital stay. Reports sore throat. Unable to recall how he ended up in the hospital and reports he \"must have passed out\".     O: Alert, confused.  HEENT: No palpable neck swelling or LAD, no palpable masses over the parotids. No oral swelling. Uvula midline. Oropharynx clear. Voice garbled, but strong, not breathy  Respiratory: breathing non-labored on NC, gurgling on secretions. No stridor.     FIBEROPTIC ENDOSCOPY:  Due to concern for upper airway edema/obstruction, fiberoptic laryngoscopy was indicated. After obtaining verbal consent, the nose was topically decongested and anesthetized. The fiberoptic laryngoscope was passed under endoscopic vision through the right nasal passage. The turbinates were normal. The inferior and middle meati were clear bilaterally without purulence, masses, or polyps. The nasopharynx was clear. The eustachian tubes were clear. The soft palate appeared normal with good mobility. The epiglottis was sharp, and the visualized portion of the vallecula was clear. The larynx was clear, there are b/l irregularities at the vocal process, right cord appears notched out/ulcerated and symmetric position on the left cord with a small out pouching growth. There is adequate opening between the cords and a larger posterior glottic airway. The cords are mobile on phonation.  The arytenoids were boggy and edematous, but not obstructing the glottis. There is pooling of clear secretions that is visibly aspirated and unable to completely clear with cough.     A/P: Zachary Gan is a 82 year old male with a past medical history of PE, DVT, PVD, venous stasis ulcers, HTN, and HLD presenting as a transfer from [facility] after he was intubated in the " setting of respiratory distress following a four day course of cough and sore throat. There was initial concern regarding airway compression from a thyroid nodule seen on CT scan but this was felt to be noncontributory to acute presentation. Laryngoscopy reveals boggy arytenoid edema that is non-obstructive, contact ulcer at the right vocal process and edema/granuloma formation at the left vocal process. These vocal cord changes are likely related to contact irritation from the endotracheal tube. No acute upper airway obstruction.     - Continue IV decadron x 24h after extubation  - PPI  - Humidity added to any supplemental O2  - start inhaled steroid - Flovent inhaler  - Voice rest x 1 week  - Follow up in voice clinic 2-4 weeks  - SLP evaluation for swallowing given aspiration seen on scope  - Remainder of care per primary team    - Patient and above plan to be discussed with SARAH RuizC  Otolaryngology-Head & Neck Surgery  Please contact ENT by dialing * [phone] and entering job code [phone].

## 2022-10-10 NOTE — PROGRESS NOTES
BRIEF SW NOTE    SW received phone call from [facility] reporting that pt had home RN, PT, and Speech services. They have Epic access and will continue monitor pt. They would be able to resume services if pt progresses to discharging home.     SW/RNCC will continue to follow for support and discharge planning    [facility]   Ph: [phone]    JAEL Payton, ANGELA  4A/4C   Ph: [phone]  Pager: [phone]

## 2022-10-11 NOTE — PROGRESS NOTES
"Transfer    Transferred to: 6A room 13 Pending (room not ready)  Via: Wheelchair  Reason for transfer:Pt no longer appropriate for 6B- improved patient condition  Belongings: Packed and sent with pt  Chart: Delivered with pt to next unit  Medications: Meds sent to new unit with pt  Report given to: Lazara ANN RN  Pt status:    /64 (BP Location: Right arm)   Pulse 77   Temp 97.8  F (36.6  C) (Oral)   Resp 16   Ht 1.803 m (5' 10.98\")   Wt 116.9 kg (257 lb 11.5 oz)   SpO2 98%   BMI 35.96 kg/m       Neuro: A&Ox4. Cooperative. Able to make needs known. Opens eyes spontaneously and responds with clear logical speech. Visual deficits due to not having his glasses.   Cardiac: VSS SR. HR: 70's. intermittent SB when sleeping, goes up when aroused.  Respiratory: Room Air, intermittent desat. Placed on 1-2 L O2 nasal when sleeping. Lungs clear and LL/RL diminished. Hoarse voice, productive cough with yellow thick sputum. Abdominal muscle use when breathing. SOB after turning/repositioning or ad pérez.  GI/: Adequate urine output with external catheter (premofit) to suction. Last BM: 10/11. Audible normoactive bowel sounds.   Diet/appetite: Tolerating Soft and Bite sized diet with thin liquids. Dentures with sister.  Activity/Mobility:  Assist of 2 gt/walker to chair/commode  Pain: At acceptable level on current regimen. Denies pain; Pain to touch only on Bilateral LE where injuries are.    Skin: redness and bruising on sacrum. Leg wounds and drainage on Blat Lower extremeties  LDA's: 2 PIVs (RL) all saline locked  "

## 2022-10-11 NOTE — PROGRESS NOTES
"   10/11/22 0927   Appointment Info   Signing Clinician's Name / Credentials (PT) DOUG Francisco   Student Supervision Direct supervision provided;Direct Patient Contact Provided;Therapy services provided with the co-signing licensed therapist guiding and directing the services, and providing the skilled judgement and assessment throughout the session   Living Environment   People in Home spouse   Current Living Arrangements assisted living   Home Accessibility stairs to enter home   Number of Stairs, Main Entrance 1   Stair Railings, Main Entrance railing on right side (ascending)   Transportation Anticipated health plan transportation   Living Environment Comments Pt lives in assisted living facility with wife, no stairs within own unit. Pt thinks there is 1 CIRO but is not sure. Pt said he does not regularly have to navigate stairs. Relies on transportation from assisted living.   Self-Care   Usual Activity Tolerance moderate   Current Activity Tolerance fair   Regular Exercise Yes   Activity/Exercise Type walking;other (see comments)  (Pt stated walking around assisted living facility and doing UE exercises 3-4x/week, unable to remember where exercises came from)   Exercise Amount/Frequency 3-5 times/wk   Equipment Currently Used at Home walker, rolling   Fall history within last six months yes   Number of times patient has fallen within last six months 1   Activity/Exercise/Self-Care Comment Pt walks and does UE exercises 3-4x/week, uses FWW at baseline when ambulating. Last fall in July 2022   General Information   Onset of Illness/Injury or Date of Surgery 10/09/22   Referring Physician Carlos Bernal MD   Patient/Family Therapy Goals Statement (PT) Wants to return back to assisted living facility and wife   Pertinent History of Current Problem (include personal factors and/or comorbidities that impact the POC) Per chart review \"Zachary Gan is a 82 year old male with hx of DVT, PE on " "apixiban, PVD, HTN, who was transferred from OSH after he required intubation for respiratory distress relating to a thyroid nodule. Likely epiglottis given 4 days of coughing and sore throat prior to presentation to OSH. \"   Existing Precautions/Restrictions fall   General Observations Pt is s/p extubation on 10/10 2/2 respiratory distress. Is a falls risk.   Cognition   Affect/Mental Status (Cognition) WFL   Pain Assessment   Patient Currently in Pain No   Integumentary/Edema   Integumentary/Edema Comments lymphedema managing   Posture    Posture Forward head position;Protracted shoulders;Kyphosis   Range of Motion (ROM)   Range of Motion ROM is WFL   Strength (Manual Muscle Testing)   Strength (Manual Muscle Testing) Able to perform R SLR;Able to perform L SLR   Bed Mobility   Comment, (Bed Mobility) Supine>sit CGA, needed HOB elevated to push off for easier sitting up   Transfers   Comment, (Transfers) Sit<>stand CGA, needed bed raised up 2/2 difficulty with standing up from lower surface height   Gait/Stairs (Locomotion)   Comment, (Gait/Stairs) Not formally assessed 2/2 pt wanting to stop for breakfast. Demonstrated pre gait with in place stepping   Balance   Balance Comments good sitting balance, demonstrates posterior lean in standing, difficulty maintaining upright posture   Sensory Examination   Sensory Perception Comments Hypersensitive to touch in B LE from mid-shin down   Clinical Impression   Criteria for Skilled Therapeutic Intervention Yes, treatment indicated   PT Diagnosis (PT) impaired functional mobility   Influenced by the following impairments generalized weakness/deconditioning, balance   Functional limitations due to impairments bed mobility, transfers, standing, gait, functional IND, activity tolerance   Clinical Presentation (PT Evaluation Complexity) Stable/Uncomplicated   Clinical Presentation Rationale clinical judgement   Clinical Decision Making (Complexity) low complexity   Planned " Therapy Interventions (PT) balance training;bed mobility training;gait training;home exercise program;patient/family education;strengthening;transfer training;progressive activity/exercise;home program guidelines   Risk & Benefits of therapy have been explained evaluation/treatment results reviewed;care plan/treatment goals reviewed;risks/benefits reviewed;participants voiced agreement with care plan;participants included;patient   Clinical Impression Comments Pt demonstrates adequate strength for standing and short distance gait, did not formally assess gait but per pt, unable to walk far distances 2/2 weakness. Demonstrates posterior lean in standing, is a fall risk   PT Total Evaluation Time   PT Eval, Low Complexity Minutes (94271) 10   Physical Therapy Goals   PT Frequency 3x/week   PT Predicted Duration/Target Date for Goal Attainment 10/18/22   PT Goals Bed Mobility;Transfers;Gait   PT: Bed Mobility Supine to/from sit;Within precautions;Supervision/stand-by assist   PT: Transfers Supervision/stand-by assist;Sit to/from stand;Bed to/from chair;Assistive device;Within precautions   PT: Gait Supervision/stand-by assist;Rolling walker;100 feet   PT Discharge Planning   PT Plan Ambulate with FWW to wc, initiate LE HEP   PT Discharge Recommendation (DC Rec) Transitional Care Facility;home with home care physical therapy   PT Rationale for DC Rec Pt demonstrates being a high falls risk, generalized weakness, needs 24 hour assistance for mobility and safety. Currently living at assisted living, if facility cannot provide proper level of assistance for safety, rec TCU. If facility can provide proper level of assistance, rec home with home PT for further mobility and safety progression.   PT Brief overview of current status Sit<>stand CGA, posterior lean in standing   Total Session Time   Total Session Time (sum of timed and untimed services) 10

## 2022-10-11 NOTE — PROGRESS NOTES
Transfer  Transferred from: 4A  Via:bed  Reason for transfer: Pt appropriate for 6B-   Family: Not aware  Belongings: No belongings with pt  Chart: Received with pt  Medications: Meds received from old unit with pt  Code Status verified on armband: yes  2 RN Skin Assessment Completed By: King EZIO and Gavi RN  Med rec completed: yes  Bed surface reassessed with algorithm and charted: yes  New bed surface ordered: no  Suction/Ambu bag/Flowmeter at bedside: yes    Report received from: 4A   Pt status: A&O denies pain SOB, VSS    2 RN skin assessment completed by King EZIO & Gavi RN.

## 2022-10-11 NOTE — PROGRESS NOTES
"Otolaryngology Progress Note    S: NAEON. Breathing and voice improving, patient reports near baseline.    O: /77 (BP Location: Right arm)   Pulse 77   Temp 98  F (36.7  C) (Oral)   Resp 17   Ht 1.803 m (5' 10.98\")   Wt 116.9 kg (257 lb 11.5 oz)   SpO2 93%   BMI 35.96 kg/m    General: resting comfortably  HEENT: EOM intact, PERRLA, neck soft and nontender without masses. Voice hoarse, improved from previous.   Pulm: Breathing comfortably on 1.5 LPM NCO2, no stridor or stertor. Moderate amount of secretions.   CV: Extremities warm and well-perfused. Venous stasis ulcers and lymphedema unchanged.  Neuro: CNII-XII intact, no focal deficits.    CBC  Recent Labs   Lab 10/11/22  0508 10/10/22  0443 10/09/22  0349   WBC 9.5 8.9 13.3*   RBC 4.55 4.09* 4.86   HGB 11.0* 10.0* 12.1*   HCT 36.7* 32.1* 40.3   MCV 81 79 83   MCH 24.2* 24.4* 24.9*   MCHC 30.0* 31.2* 30.0*   RDW 17.8* 17.5* 17.2*    268 188     INR  Recent Labs   Lab 10/09/22  0445   INR 1.23*       A/P: Zachary Gan is a 82 year old male with a past medical history of PE, DVT, PVD, venous stasis ulcers, HTN, and HLD presenting as a transfer from OSH after he was intubated in the setting of respiratory distress following a four day course of cough and sore throat. There was initial concern regarding airway compression from a thyroid nodule seen on CT scan but this was felt to be noncontributory to acute presentation. May be more of a supraglottitis picture. Clinically improving.    Neuro:  - Pain control with Tylenol, oxy    HEENT:  - Decadron complete  - Flovent    CV:   - PTA lipitor, apixaban, Lasix    Pulm:  - Wean O2 as tolerated    Heme:  - LOYDA    ID:  - Continue IV ceftriaxone for today. Transition to PO Augmentin tomorrow    Endo:  - Medium dose sliding scale insulin    GI:  - Soft and bite sized diet, cleared for thin liquids  - PPI, bowel reg    MSK:  - Venous stasis unchanged  - Appreciate lymphedema recs    Consults:  - WOC, " SLP, Nutrition, SW, PT, OT, Lymphedema    Ppx:  - SCDs, IS    Dispo:  - Anticipate in the next one-two days, TCU vs current assisted living. Consulting services favoring TCU.    -- Patient and above plan discussed with MD Hiwot Britton MD  Otolaryngology-Head & Neck Surgery PGY-1  Please contact ENT with questions by dialing [phone] and entering job code [phone] when prompted.

## 2022-10-11 NOTE — PROGRESS NOTES
Care Management Follow Up    Length of Stay (days): 2    Expected Discharge Date: 10/15/2022     Concerns to be Addressed: discharge planning     Patient plan of care discussed at interdisciplinary rounds: Yes    Anticipated Discharge Disposition: Transitional Care     Anticipated Discharge Services: TBD   Anticipated Discharge DME: TBD      Patient/family educated on Medicare website which has current facility and service quality ratings: yes   Education Provided on the Discharge Plan:    Patient/Family in Agreement with the Plan: yes    Referrals Placed by CM/SW:     Pending Referrals 10/11:    [facility]  [address] [address]  PH: [phone]     [facility]  [address] [place] [address]  PH: [phone]     Declined/Discontinued:      [facility] - no male beds available     Private pay costs discussed: Not applicable    Additional Information:    Pt is from an assisted living facility (see below) and would not be medically appropriate to return today due to acuity of cares. [facility] reports that they only provide minimal cares to pt. Pt lives there with his wife. NORM spoke with pt's sister Deborah (PH: [phone]) who provided initial transitional care options for pt. Deborah would like pt to be close to where she lives in [place].      [facility]  [address] [place], [place]  Contact: Leeanne  Ph: [phone]    SW to continue to follow for discharge planning and TCU placement. SW will follow up with pt's sister to obtain more facility options because they are limited right now.     JAEL Cuevas, LGSW   6B    Phone: [phone]  Pager: [phone]

## 2022-10-11 NOTE — PLAN OF CARE
Neuro: A&Ox3-4. Occasional disorientation to situation or place. Able to remember once explained and reoriented. Opens eyes spontaneously and responds with clear logical speech. Visual deficits due to not having his glasses. Able to make needs known.     Cardiac: SR to ST at rest and intermittently SB when asleep with improved HR once aroused (70s-90s). VSS. SBP: 110s-140s DBP: 50s-70s.    Respiratory: Sating 90% on RA at rest. Drop to low 80s SpO2 when asleep with quick recovery on 1-3L NC (>93%). Hoarse voice, productive cough wit yello thick sputum. Abdominal muscle use when breathing. SOB after turning/repositioning.  GI/: Adequate urine output with external urinary catheter. No BM; passing flatus with audible normoactive bowel sounds.   Diet/appetite: Tolerating Soft and Bite sized diet with thin liquids. Good appetite.  Activity:  Assist of 2, up to chair and commode. Pt has generalized weakness.   Pain: At acceptable level on current regimen. Pain to touch only on Bilateral LE where injuries are.    Skin: No new deficits noted.  LDA's: x1 L-PIV (saline locked), x1 R-PIV (saline locked), external urinary catheter.      Plan: Continue with POC. Notify primary team with changes. Contact Sister Rio for updates and plans. Pt is eager to get out of bed with PT.

## 2022-10-11 NOTE — CONSULTS
Care Management Initial Consult    General Information  Assessment completed with: Leeanne Prince (from pt's DEEPAK)  Type of CM/SW Visit: Initial Assessment    Primary Care Provider verified and updated as needed: Yes   Readmission within the last 30 days: no previous admission in last 30 days      Reason for Consult: discharge planning, other (see comments) (elevated risk unplanned readmission)  Advance Care Planning:            Communication Assessment  Patient's communication style: spoken language (English or Bilingual)    Hearing Difficulty or Deaf: no        Cognitive  Cognitive/Neuro/Behavioral: .WDL except  Level of Consciousness: alert  Arousal Level: opens eyes spontaneously  Orientation: oriented x 4  Mood/Behavior: calm, cooperative  Best Language: 0 - No aphasia  Speech: hoarse    Living Environment:   People in home: facility resident     Current living Arrangements: assisted living      Able to return to prior arrangements: yes (will need TCU stay prior to return)       Family/Social Support:  Care provided by:    Provides care for:    Marital Status:              Description of Support System:           Current Resources:   Patient receiving home care services: Yes  Skilled Home Care Services: Skilled Nursing  Community Resources: None  Equipment currently used at home: walker, rolling  Supplies currently used at home: None    Employment/Financial:  Employment Status: retired        Financial Concerns:             Lifestyle & Psychosocial Needs:  Social Determinants of Health     Tobacco Use: Not on file   Alcohol Use: Not on file   Financial Resource Strain: Not on file   Food Insecurity: Not on file   Transportation Needs: Not on file   Physical Activity: Not on file   Stress: Not on file   Social Connections: Not on file   Intimate Partner Violence: Not on file   Depression: Not on file   Housing Stability: Not on file       Functional Status:  Prior to admission patient needed assistance:    Dependent ADLs:: Dressing, Ambulation-walker (help with socks/shoes)  Dependent IADLs:: Cleaning, Cooking, Laundry, Shopping, Meal Preparation, Medication Management, Transportation       Mental Health Status:          Chemical Dependency Status:                Values/Beliefs:  Spiritual, Cultural Beliefs, Judaism Practices, Values that affect care:                 Additional Information:  RNCC called patient's [facility], spoke with Leeanne, ph: [phone]. Per Leeanne, patient is baseline independent with transfers, gets minimal cares (putting socks and shoes on), patient would need to be light assist of one up at night/day. Patient at current per bedside nurse is stand pivot, assist of 2 to chair/BSC. Patient has recs from PT for TCU and RNCC has notified SW of this plan. Patient would be new to home O2, but per nursing, is not needing O2 today. Patient's current address is [place], [place], sees PCP in [place], [place]. RNCC available for any further assistance.    Update: patient has HC RN through [facility]. Resumption order placed.    [facility] [place], [place]  Ph: [phone]  Fax: [phone]    [facility]  [address] [place], [place]  Contact: Leeanne  Ph: [phone]      ANMOL Crews, BA, RN, CMSRN, RNCC  Covering Units 6D/OBS  Pager: [phone]  Phone: [phone]  6th floor Weekend/Holiday Pager: [phone]  Observation weekend/after hours: [phone]

## 2022-10-12 NOTE — PROGRESS NOTES
10/11/22 1100   Appointment Info   Signing Clinician's Name / Credentials (OT) Altaf Tucker, OTR/L   Living Environment   People in Home spouse   Current Living Arrangements assisted living   Home Accessibility stairs to enter home   Number of Stairs, Main Entrance 1   Stair Railings, Main Entrance railing on right side (ascending)   Living Environment Comments Pt reports he has a walk in shower with grab bars and shower chair.   Self-Care   Usual Activity Tolerance moderate   Current Activity Tolerance fair   Regular Exercise No   Equipment Currently Used at Home grab bar, tub/shower;shower chair;walker, rolling  (power recliner with standing function.)   Fall history within last six months yes   Number of times patient has fallen within last six months 1   General Information   Onset of Illness/Injury or Date of Surgery 10/09/22   Referring Physician Eloina Bullock, KEHINDE   Patient/Family Therapy Goal Statement (OT) Pt would like to return home independently.   Additional Occupational Profile Info/Pertinent History of Current Problem Zachary Gan is a 82 year old male with a past medical history of PE, DVT, PVD, venous stasis ulcers, HTN, and HLD presenting as a transfer from [facility] after he was intubated in the setting of respiratory distress following a four day course of cough and sore throat. There was initial concern regarding airway compression from a thyroid nodule seen on CT scan but this was felt to be noncontributory to acute presentation. May be more of a supraglottitis picture. Clinically improving.   Existing Precautions/Restrictions fall   Left Upper Extremity (Weight-bearing Status) full weight-bearing (FWB)   Right Upper Extremity (Weight-bearing Status) full weight-bearing (FWB)   Left Lower Extremity (Weight-bearing Status) full weight-bearing (FWB)   Right Lower Extremity (Weight-bearing Status) full weight-bearing (FWB)   Cognitive Status Examination   Orientation Status orientation to  person, place and time   Follows Commands verbal cues/prompting required   Visual Perception   Visual Impairment/Limitations corrective lenses full-time   Sensory   Sensory Quick Adds sensation intact   Pain Assessment   Patient Currently in Pain No   Range of Motion Comprehensive   Comment, General Range of Motion RUE AROM WFL. LUE AROM 0-~80 degrees humeral flexion.   Strength Comprehensive (MMT)   Comment, General Manual Muscle Testing (MMT) Assessment Pt presents with generalized weakness. MMT not performed due to Left shoulder pain/limitations.   Bed Mobility   Comment (Bed Mobility) Min A and vc's from inclined bed.   Transfers   Transfer Comments CGA/Min A and vc's with FWW   Activities of Daily Living   BADL Assessment/Intervention lower body dressing;grooming;toileting   Lower Body Dressing Assessment/Training   Randolph Level (Lower Body Dressing) set up;verbal cues;minimum assist (75% patient effort)   Grooming Assessment/Training   Randolph Level (Grooming) set up;verbal cues;minimum assist (75% patient effort)   Toileting   Randolph Level (Toileting) set up;verbal cues;moderate assist (50% patient effort)   Clinical Impression   Criteria for Skilled Therapeutic Interventions Met (OT) Yes, treatment indicated   OT Diagnosis Decreased independence with functional transfers and ADLS.   OT Problem List-Impairments impacting ADL problems related to;activity tolerance impaired;cognition;flexibility;range of motion (ROM);mobility;strength   Assessment of Occupational Performance 3-5 Performance Deficits   Identified Performance Deficits Decreased independence with functional transfers and ADLS/IADLs.   Planned Therapy Interventions (OT) ADL retraining;IADL retraining;cognition;ROM;strengthening;stretching;transfer training;home program guidelines;progressive activity/exercise   Clinical Decision Making Complexity (OT) low complexity   Risk & Benefits of therapy have been explained  evaluation/treatment results reviewed;care plan/treatment goals reviewed;patient   OT Total Evaluation Time   OT Eval, Low Complexity Minutes (07379) 10   Total Session Time   Total Session Time (sum of timed and untimed services) 10

## 2022-10-12 NOTE — PROGRESS NOTES
"Otolaryngology Progress Note    S: NAEON. Known baseline bradycardic episodes while sleeping. EKG obtained last night, reviewed and consistent with history. Breathing and voice improved, patient feels well.    O: BP (!) 151/63 (BP Location: Left arm)   Pulse (!) 49   Temp (!) 96.4  F (35.8  C) (Oral)   Resp 20   Ht 1.803 m (5' 10.98\")   Wt 116.9 kg (257 lb 11.5 oz)   SpO2 99%   BMI 35.96 kg/m    General: Resting comfortably  HEENT: EOM intact, pupils reactive, neck soft and nontender without masses. Voice slightly hoarse, improved from previous.   Pulm: Breathing comfortably on 2 LPM NCO2, no stridor or stertor. Mild audible secretions.   CV: Extremities warm and well-perfused. Venous stasis ulcers and lymphedema unchanged.  Neuro: CNII-XII intact, no focal deficits.    CBC  Recent Labs   Lab 10/11/22  0508 10/10/22  0443 10/09/22  0349   WBC 9.5 8.9 13.3*   RBC 4.55 4.09* 4.86   HGB 11.0* 10.0* 12.1*   HCT 36.7* 32.1* 40.3   MCV 81 79 83   MCH 24.2* 24.4* 24.9*   MCHC 30.0* 31.2* 30.0*   RDW 17.8* 17.5* 17.2*    268 188     INR  Recent Labs   Lab 10/09/22  0445   INR 1.23*       A/P: Zachary RADHA Gan is a 82 year old male with a past medical history of PE, DVT, PVD, venous stasis ulcers, HTN, and HLD presenting as a transfer from OSH after he was intubated in the setting of respiratory distress following a four day course of cough and sore throat. There was initial concern regarding airway compression from a thyroid nodule seen on CT scan but this was felt to be noncontributory to acute presentation. May be more of a supraglottitis picture. Clinically improving.    Neuro:  - Pain control with Tylenol, oxy    HEENT:  - Decadron complete  - Flovent    CV:   - PTA lipitor, apixaban, Lasix    Pulm:  - Wean O2 as tolerated    Heme:  - LOYDA    ID:  - IV ceftriaxone changed to Augmentin today    Endo:  - Medium dose sliding scale insulin    GI:  - Soft and bite sized diet, cleared for thin liquids  - PPI, " bowel reg    MSK:  - Venous stasis unchanged  - Appreciate lymphedema recs    Consults:  - WOC, SLP, Nutrition, SW, PT, OT, Lymphedema    Ppx:  - SCDs, IS    Dispo:  - Anticipate in the next one-two days, TCU vs current assisted living. Consulting services favoring TCU.    -- Patient and above plan discussed with MD Hiwot Britton MD  Otolaryngology-Head & Neck Surgery PGY-1  Please contact ENT with questions by dialing [phone] and entering job code [phone] when prompted.

## 2022-10-12 NOTE — PLAN OF CARE
Status: admitted from OSH on 10/9 for respiratory distress, extubated on 10/10.  Vitals: VSS ex intermittent bradycardia  Neuros: AO x4. Hoarse voice. Blurry vision, does not have his glasses with him.  IV: PIV SL  Labs/Electrolytes: BG checks   Resp/trach: LS diminished, on 1L via NC during the day  Diet: Level 6 with thins, good intake  Bowel status: LBM 10/11  : voiding spontaneously   Skin: BLE venous stasis ulcers, dressings changed this AM.   Pain: denied  Activity: A1 GB/Walker, up in the chair for meals  Plan: NORM following for TCU placement, continue POC.

## 2022-10-12 NOTE — PLAN OF CARE
Status: Admitted 10/9 from OSH with respiratory distress following 4 days of cough/sore throat, extubated 10/10. Initial concern was thyroid nodule seen on CT, found to noncontributory to acute presentation  Vitals: VSS   Neuros: A&Ox4, generalized weakness, hoarse speech, blurry vision (baseline)  IV: PIV SL  Labs/Electrolytes: BG checks  Resp/trach: On 2 L NC while asleep to keep sats > 90%. Diminished LS, productive cough. Uses oral suction independently  Diet: Level 6, thins  Bowel status: Formed BM today, continent   : External cath in place  Skin: Venous stasis ulcers to both lower extremities, UTV. Wound care daily (see orders). BLE mercedez. Bruising throughout  Pain: Denies  Activity: Up with 1, GB, walker  Plan/updates: Decadron completed. Medically ready for discharge, SW following for TCU placement    Arrived from: 6B  Belongings/meds: N/a  2 RN Skin Assessment Completed by: Lazara Magallon   Non-intact findings documented (yes/no/NA): Yes

## 2022-10-12 NOTE — PROGRESS NOTES
Care Management Follow Up    Length of Stay (days): 3    Expected Discharge Date: Pt ready for discharge on 10/11/2022   Concerns to be Addressed: Disposition planning  Patient plan of care discussed at interdisciplinary rounds: Yes    Anticipated Discharge Disposition: Short term TCU placement     Anticipated Discharge Services:  Short term TCU placement  Anticipated Discharge DME:  Not applicable at this time    Patient/family educated on Medicare website which has current facility and service quality ratings:  Yes  Education Provided on the Discharge Plan:  yes    Patient/Family in Agreement with the Plan: yes    Referrals Placed by CM/SW: Post acute care facility's  Private pay costs discussed: Not applicable at this time    Additional Information:  Pt transferred to  from unit 6B.  A TCU stay is recommended and per Eloina Bullock (ENT P.A.), pt is medically ready for discharge.    The following facility is not able to accept pt for admit:  - [facility]    SW placed follow up calls to Admissions Coordinator's at the following facility's to obtain outcome of assessment:  - [facility] ([place]), left message for Admissions to call  - [facility] ([place]), per Tony, they have no anticipated openings through 9/16/2022.    NORM met with pt to update.  Pt requests that SW speak with his sister (Deborah) in regards to discharge planning.      SW phoned pt's sister (Deborah) and updated in regards to the above.  NORM requested add'l facility preferences.  Additional preferences identified:  [facility] [place], NORM left a message for [facility] to call in regards to bed availability  - [facility] ([place]), NORM left a message referring pt and faxed assessment materials via [facility] ([place]), [facility] states that they may have an opening, NORM faxed referral via [employer]   [facility] ([place]), states that they have a pt who is suppose to  leave AMA today and if this happens they would have an opening, faxed referral via [facility] ([place]), left message referring pt and faxed assessment materials via [facility] ([place]), left message referring pt and faxed assessment materials.    NORM contacted Eloina MCHUGH, and requested that someone from the ENT team call to provide [facility] request.     NORM will continue to follow for discharge planning.    BEV Hdez  Social Work, 6A  Phone:  [phone]  Pager:  [phone]  10/12/2022                CARLITOS Nelson

## 2022-10-12 NOTE — PLAN OF CARE
Status: Admitted from OSH on 10/9 for respiratory distress, extubated on 10/10.  Vitals: VSS  Neuros: A&Ox4, Hoarse voice. Blurry vision, does not have his glasses with him  IV: PIV SL  Labs/Electrolytes: BG Q4HR   Resp/trach: On 1L NC   Diet: Level 6 with thins  Bowel status: LBM 10/12  : Voids spontaneously   Skin: BLE venous stasis ulcers, UTV dressing in place   Pain: Denies   Activity: Ax1 w/GB and walker   Plan: Continue to monitor

## 2022-10-12 NOTE — PLAN OF CARE
Status: Admitted 10/9 from OSH with respiratory distress following 4 days of cough/sore throat, extubated 10/10. Initial concern was thyroid nodule seen on CT, found to noncontributory to acute presentation  Vitals: VSS, afebrile. HR in 50s during sleep at baseline. HR irregular, went down to 30s twice during sleep, unsure if accurate read on continuous pulse ox. EKG obtained-team updated.   Neuros: A/ox4. Hoarse speech. Strength 5/5 throughout. Blurry vision at baseline. C/o numbness to feet baseline.  IV: PIV Rt FA SL.  Labs/Electrolytes: AM labs.  Resp/trach: LS coarse, on 2L NC, sats >95%.  Diet: Regular.  Bowel status: LBM 10/11  : Voids per external cath. Removed external cath this am, urinal at bedside. Per pt, he knows when he needs to void, the external cath was convenient.  Skin: Redness blanchable to back, sacrum/coxxy, dusky/mercedez/blotchy to BLE w/ venous stasis ulcers. Wound change next on 10/12.  Pain: Denies  Activity: Turns in bed, up with 1A GB/FWW.  Plan: Continue current POC. Medically ready for discharge, pending discharge to TCU.   Updates this shift: none

## 2022-10-13 NOTE — PROGRESS NOTES
Brief entry:  NORM received a call from Admissions (Radha) for [facility] stating that they have openings. NORM faxed a referral.    BEV Hdez  Social Work, 6A  Phone:  [phone]  Pager:  [phone]  10/13/2022

## 2022-10-13 NOTE — PROGRESS NOTES
Care Management Follow Up     Length of Stay (days): 4     Expected Discharge Date: Pt ready for discharge since 10/11/2022   Concerns to be Addressed: Disposition planning  Patient plan of care discussed at interdisciplinary rounds: Yes     Anticipated Discharge Disposition: Short term TCU placement     Anticipated Discharge Services:  Short term TCU placement  Anticipated Discharge DME:  Not applicable at this time     Patient/family educated on Medicare website which has current facility and service quality ratings:  Yes  Education Provided on the Discharge Plan:  yes    Patient/Family in Agreement with the Plan: yes     Referrals Placed by CM/SW: Post acute care facility's  Private pay costs discussed: Not applicable at this time     Additional Information:  Pt transferred to 6A from unit 6B.  A TCU stay is recommended and per Eloina Bullock (ENT P.A.), pt is medically ready for discharge.     The following facility's are not able to accept pt for admit:  - [facility], is full  - [facility] ([facility]), per [facility], they have no anticipated openings through 9/16/2022.     SW placed follow up calls to Admissions Coordinator's at the following facility's to obtain outcome of assessment:  - [facility] ([place]), left message for Admissions to call in regards to acceptance  [facility] [place], no openings for at least the next 2 weeks  - [facility] ([place]), is full  - [facility] ([place]), full  - [facility] ([place]gy), states person who was suppose to leave [place] on 10/12/2022 did not and thus, they remain full   - [facility] (Ram[place]ep), left message for Admissions to call in regards to acceptance  - [facility] known [facility] ([place]), left message for Admissions to call in regards to acceptance.    NORM phoned pt's sister (Deborah) and updated.  NORM requested add'l facility preferences.   Additional  preferences identified:  - [facility] in [place] ([place]), SW spoke with [place] and they have opening.  NORM faxed referral via [facility]  - [facility] in [place], NORM left a message referring pt and faxed assessment materials via [facility]  - [facility] [place] ([place]), is full  - [facility] ([place]), left message for Admissions to call in regards to acceptance.      NORM will continue to follow for discharge planning.    BEV Hdez  Social Work, 6A  Phone:  [phone]  Pager:  [phone]  10/13/2022

## 2022-10-13 NOTE — PROGRESS NOTES
"Otolaryngology Progress Note    S: NAEON. Patient feels well.     O: /59 (BP Location: Right arm)   Pulse 77   Temp 97.2  F (36.2  C) (Oral)   Resp 16   Ht 1.803 m (5' 10.98\")   Wt 116.9 kg (257 lb 11.5 oz)   SpO2 95%   BMI 35.96 kg/m    General: Resting comfortably  HEENT: EOM intact, pupils reactive, neck soft and nontender without masses. Voice improved to baseline.  Pulm: Breathing comfortably on 1-2 LPM NCO2, no stridor or stertor. Mild audible secretions.   CV: Extremities warm and well-perfused. BLE wrapped, lymphedema unchanged.  Neuro: CNII-XII intact, no focal deficits.    CBC  Recent Labs   Lab 10/12/22  0655 10/11/22  0508 10/10/22  0443 10/09/22  0349   WBC 11.1* 9.5 8.9 13.3*   RBC 4.46 4.55 4.09* 4.86   HGB 10.9* 11.0* 10.0* 12.1*   HCT 36.3* 36.7* 32.1* 40.3   MCV 81 81 79 83   MCH 24.4* 24.2* 24.4* 24.9*   MCHC 30.0* 30.0* 31.2* 30.0*   RDW 17.8* 17.8* 17.5* 17.2*    323 268 188     INR  Recent Labs   Lab 10/09/22  0445   INR 1.23*       A/P: Zachary GARCIA Malik is a 82 year old male with a past medical history of PE, DVT, PVD, venous stasis ulcers, HTN, and HLD presenting as a transfer from OSH after he was intubated in the setting of respiratory distress following a four day course of cough and sore throat. There was initial concern regarding airway compression from a thyroid nodule seen on CT scan but this was felt to be noncontributory to acute presentation. May be more of a supraglottitis picture. Clinically improving and reports he is back to baseline.    Neuro:  - Pain control with Tylenol, oxy    HEENT:  - Decadron complete  - Flovent    CV:   - PTA lipitor, apixaban, Lasix    Pulm:  - Wean O2 as tolerated    Heme:  - LOYDA    ID:  - Augmentin for 7 days    Endo:  - Medium dose sliding scale insulin    GI:  - Soft and bite sized diet, cleared for thin liquids  - PPI, bowel reg    MSK:  - Venous stasis unchanged  - Appreciate lymphedema recs    Consults:  - WOC, SLP, " Nutrition, SW, PT, OT, Lymphedema    Ppx:  - SCDs, IS    Dispo:  - Anticipate in the next one-two days, TCU vs current assisted living. Consulting services favoring TCU.    -- Patient and above plan discussed with MD Hiwot Britton MD  Otolaryngology-Head & Neck Surgery PGY-1  Please contact ENT with questions by dialing [phone] and entering job code [phone] when prompted.

## 2022-10-13 NOTE — PLAN OF CARE
Speech Language Therapy Discharge Summary    Reason for therapy discharge:    All goals and outcomes met, no further needs identified.  No further expectations of functional progress.    Progress towards therapy goal(s). See goals on Care Plan in [facility] electronic health record for goal details.  Goals partially met.  Barriers to achieving goals:   Pt has no way to obtain his dentures, needs a soft diet.    Therapy recommendation(s):    No further therapy is recommended. Recommend soft and bite-sized diet (IDDSI 6) and thin liquids (IDDSI 0). Unfortunately, pt does not have his dentures and has no way to obtain them here in the hospital. Pt has demonstrated he is unable to masticate very solid foods. Discussed with RN that pt is OK to have chicken noodle soup from the kitchenettes. No additional IP SLP services indicated.

## 2022-10-13 NOTE — PLAN OF CARE
Status: admitted from OSH on 10/9 for respiratory distress, extubated on 10/10.  Vitals: VSS ex intermittent bradycardia  Neuros: AO x4. Hoarse voice. Blurry vision, pt does not have his glasses with him.  IV: PIV SL  Labs/Electrolytes: BG checks   Resp/trach: LS diminished, on 1L via NC during the day  Diet: Level 6 with thins, fair intake - Pt does not have his dentures.  Bowel status: diarrhea and abd discomfort - MD ordered probiotic to help, but patient refused  : voiding spontaneously   Skin: BLE venous stasis ulcers, dressings changed this AM.   Pain: denied  Activity: A1 GB/Walker, up in the chair for meals  Plan: NORM following for TCU placement, continue POC.

## 2022-10-13 NOTE — PLAN OF CARE
Status: Admitted 10/9 from OSH with respiratory distress following 4 days of cough/sore throat, extubated 10/10. Initial concern was thyroid nodule seen on CT, found to noncontributory to acute presentation  Vitals: VSS, afebrile. HR in 50s during sleep at baseline. HR irregular.  Neuros: A/ox4. Hoarse speech. Strength 5/5 throughout. Blurry vision at baseline. C/o numbness to feet baseline.  IV: PIV Rt FA SL.  Labs/Electrolytes: AM labs.  Resp/trach: LS clear, dim, on 2L NC, sats >95%.  Diet: Regular.  Bowel status: LBM 10/13, loose.  : Voids per urinal at bedside.  Skin: Redness blanchable to back, sacrum/coxxy, dusky/mercedez/blotchy to BLE w/ venous stasis ulcers. Wound change next on 10/13.  Pain: Denies  Activity: Turns/boosts in bed, up with 1A GB/FWW to b/r. Needs cues for safe use of walker.  Plan: Continue current POC. Medically ready for discharge, pending discharge to TCU.   Updates this shift: none

## 2022-10-14 NOTE — PLAN OF CARE
Status: Admitted from OSH on 10/9 for respiratory distress, extubated on 10/10.  Vitals: VSS  Neuros: A&Ox4, Hoarse voice. Blurry vision, does not have his glasses with him  IV: PIV SL  Labs/Electrolytes: BG Q4HR   Resp/trach: On 1L NC   Diet: Level 6 with thins  Bowel status: LBM 10/12  : Voids spontaneously   Skin: BLE venous stasis ulcers, UTV dressing in place   Pain: Denies   Activity: Ax1 w/GB and walker   Plan: SW following for TCU placement, continue to monitor

## 2022-10-14 NOTE — PLAN OF CARE
Status: admitted from OSH on 10/9 for respiratory distress, extubated on 10/10.  Vitals: VSS ex intermittent bradycardia  Neuros: AO x4. Blurry vision, pt does not have his glasses with him.  IV: PIV SL  Labs/Electrolytes: BG checks   Resp/trach: LS diminished, on 1L via NC during the day  Diet: Level 6 with thins, fair intake - Pt does not have his dentures.  Bowel status: LBM 10/14, loose  : voiding spontaneously   Skin: BLE venous stasis ulcers, dressings changed this AM.   Pain: denied  Activity: A1 GB/Walker, up in the chair for meals  Plan: NORM following for TCU placement, continue POC.

## 2022-10-14 NOTE — DISCHARGE SUMMARY
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      HPI: Zachary Gan is a 82 year old male with history of PE, DVT, PVD, venous stasis ulcers, HTN, and HLD who presented as a transfer from [facility] after he was intubated in the setting of respiratory distress. There was concern of compression of the airway from a thyroid nodule seen on CT however it was determined to be non contributory. At the time of admission, given limited available information, the most likely inciting factor was supraglottic or glottic pathology, such as impaired vocal fold mobility.    Hospital Course: The patient was admitted for respiratory distress and was intubated on arrival. He was extubated on 10/10/2022 and post extubation scope revealed non obstructive swelling of the laryngeal structures with intact vocal fold abduction and adduction though two posterior vocal fold lesions that were consistent with intubation granulomas. OG tube was also removed on 10/10/2022. There was concern for post-extubation aspiration so Speech Language Pathology was consulted and he was cleared for soft diet and thin liquids. He was seen by PT and OT to assess mobility needs who agreed patient was appropriate for home with home care PT and home with assist, respectively. Patient had lymphedema with venous stasis ulcers on admission and the [facility] nursing team was consulted for recommendations for daily wound cares which were all taken including cleansing wounds and skin, patting dry with gauze, covering wounds with vaseline gauze and dressings, and wrapping with Kerlix. On 10/12 patient was deemed medically safe for  discharge. There were problems with re-acceptance back to prior assisted living as there was concern for ability to provide adequate care needs, accompanied by insurance company denial of TCU which was recommended. Consulting therapies confirmed that patient was appropriate for home with assist and home care as previously arranged. He had been accepted back to assisted living with plans to return on 10/23. Unfortunately on 10/22 he developed acute respiratory distress without hypoxia and was intubated given concern for ability to sustain increased WOB. Flexible laryngoscopy at that time showed minimal vocal cord abduction with small glottic airway and limited vocal cord movement overall. He was transferred to the ICU 10/22 and extubated 10/24. Flexible scope after extubation showed normal vocal cord mobility. This suggests primary etiology of distress for 10/22 episode as well as inciting event were possibly related to paradoxical vocal fold motion. Patient showed significant clinical improvement over the next few days and was discharged to assisted living on 10/27/22. Interventional radiology was consulted for recommendations regarding thyroid nodules and after reviewing thyroid US, they recommended outpatient follow up. His admission was otherwise uneventful. At discharge, the patient's pain was well controlled, he was voiding on his own, his breathing and voice were at baseline, and he was ambulating and tolerating his normal diet.     Discharge Exam:  Vitals:    10/13/22 2256 10/13/22 2351 10/14/22 0200 10/14/22 0700   BP: (!) 149/76   134/64   BP Location: Left arm   Left arm   Pulse: 86   75   Resp: 16   20   Temp:  98.4  F (36.9  C)  (!) 96.2  F (35.7  C)   TempSrc:  Oral  Axillary   SpO2: 99%  96% 96%   Weight:       Height:           General: Resting comfortably  HEENT: EOM intact, pupils reactive, neck soft and nontender without masses. Voice improved to baseline.  Pulm: Breathing comfortably on RA, no  stridor or stertor. No audible secretions.   CV: Extremities warm and well-perfused. BLE wrapped, lymphedema unchanged.  Neuro: CNII-XII intact, no focal deficits.    Discharge Medications:     Medication List      Started    amoxicillin-clavulanate 875-125 MG tablet  Commonly known as: AUGMENTIN  1 tablet, Oral, EVERY 12 HOURS     fluticasone 220 MCG/ACT inhaler  Commonly known as: FLOVENT HFA  1 puff, Inhalation, EVERY 12 HOURS     multivitamin w/minerals tablet  1 tablet, Oral, DAILY     pantoprazole 40 MG EC tablet  Commonly known as: PROTONIX  40 mg, Oral, EVERY MORNING BEFORE BREAKFAST        Modified    acetaminophen 325 MG tablet  Commonly known as: TYLENOL  650 mg, Oral, EVERY 4 HOURS PRN  What changed:     medication strength    how much to take    when to take this    reasons to take this            Discharge Procedure Orders   General info for SNF   Order Comments: Length of Stay Estimate: Long Term Care  Condition at Discharge: Stable  Level of care:skilled   Admission H&P remains valid and up-to-date: Yes  Recent Chemotherapy: N/A  Use Nursing Home Standing Orders: Yes     Follow Up and recommended labs and tests   Order Comments: Follow up in ENT clinic with Dr. Santos in 2-4 weeks. Please call the clinic with questions/concerns: [phone].    Otolaryngology/ENT Clinic:  [facility]  [address] [place] [id number]     Reason for your hospital stay   Order Comments: Respiratory failure, respiratory tract infection     Activity - Up with nursing assistance     Order Specific Question Answer Comments   Is discharge order? Yes      Resume Home Care Services     Physical Therapy Adult Consult   Order Comments: Evaluate and treat as clinically indicated.    Reason:  deconditioning     Occupational Therapy Adult Consult   Order Comments: Evaluate and treat as clinically indicated.    Reason:  deconditioning     Speech Language Path Adult  Consult   Order Comments: Evaluate and treat as clinically indicated.    Reason:  dysphagia     Lymphedema Therapy Adult Consult   Order Comments: Evaluate and treat as clinically indicated.    Reason:  lower extremity lymphedema     Diet   Order Comments: Follow this diet upon discharge: soft and bite sized diet with thin liquids     Order Specific Question Answer Comments   Is discharge order? Yes        Dispo: To assisted living in good condition. All of the patient's questions/concerns have been addressed at this time.     Hiwot Mullen MD  Otolaryngology-Head & Neck Surgery PGY-1  Please contact ENT with questions by dialing * [phone] and entering job code [phone] when prompted.

## 2022-10-14 NOTE — PROGRESS NOTES
Care Management Follow Up     Length of Stay (days): 5     Expected Discharge Date: Pt ready for discharge since 10/11/2022   Concerns to be Addressed: Disposition planning  Patient plan of care discussed at interdisciplinary rounds: Yes     Anticipated Discharge Disposition: Short term TCU placement     Anticipated Discharge Services:  Short term TCU placement  Anticipated Discharge DME:  Not applicable at this time     Patient/family educated on Medicare website which has current facility and service quality ratings:  Yes  Education Provided on the Discharge Plan:  yes    Patient/Family in Agreement with the Plan: yes     Referrals Placed by CM/SW: Post acute care facility's  Private pay costs discussed: Not applicable at this time     Additional Information:  Pt transferred to 6A from unit 6B.  A TCU stay is recommended and per Eloina Bullock ([facility] P[facility] pt is medically ready for discharge.     The following facility's are not able to accept pt for admit:  - [facility], is full  - [facility] (Tony), per Tony, they have no anticipated openings through 9/16/2022  - . [facility]'s in [place], no openings for at least the next 2 weeks  - [facility] (Peggy), states person who was suppose to leave [place] on 10/12/2022 did not and thus, they remain full  - [facility] (Kimmy), is full  - [facility] (Cornelia), full   - [facility] (Rowena), is not contracted with [facility] Medicare Advantage plans  - [facility], is full      placed follow up calls to Admissions Coordinator's at the following facility's to obtain outcome of assessment:  - [facility] (Ghassan [phone]), spoke with Ghassan who states that he will have Chapis call this  in regards to whether or not they can accept pt for admit  - [facility] (Ramandeep), Ramandeep states that they are not accepting any  admits due to staffing  - [facility] in [place] (Lizett [phone]), SW left a message for Lizett to call in regards to acceptance  - [facility] in [place] ([phone]), SW left a message for Admissions to call in regards to acceptance - [facility] (Radha [phone])), SW left a message for Admissions to call in regards to acceptance.    SW left a voice mail message for pt's sister (Deborah) to call for a discharge planning update.     SW will continue to follow for discharge planning.    BEV Hdez  Social Work, 6A  Phone:  [phone]  Pager:  [phone]  10/14/2022

## 2022-10-14 NOTE — PROGRESS NOTES
Brief Entry:  SW received call from Admissions (Deborah) at [facility] who states that they will not accept admit today or through the weekend and would like updated notes and MAR faxed on Monday 10/17/2022.    NORM will continue to follow for discharge planning.    BEV Hdez  Social Work, 6A  Phone:  [phone]  Pager:  [phone]  10/14/2022

## 2022-10-14 NOTE — PROGRESS NOTES
"Otolaryngology Progress Note    S: NAEON. Patient feels well. No abdominal discomfort.    O: /64 (BP Location: Left arm)   Pulse 75   Temp (!) 96.2  F (35.7  C) (Axillary)   Resp 20   Ht 1.803 m (5' 10.98\")   Wt 116.9 kg (257 lb 11.5 oz)   SpO2 96%   BMI 35.96 kg/m    General: Resting comfortably  HEENT: EOM intact, pupils reactive, neck soft and nontender without masses. Voice improved to baseline.  Pulm: Breathing comfortably on 1-2 LPM NCO2, no stridor or stertor. Mild audible secretions.   CV: Extremities warm and well-perfused. BLE wrapped, lymphedema unchanged.  Neuro: CNII-XII intact, no focal deficits.    CBC  Recent Labs   Lab 10/14/22  0615 10/13/22  0657 10/12/22  0655 10/11/22  0508   WBC 11.1* 11.5* 11.1* 9.5   RBC 4.68 4.65 4.46 4.55   HGB 11.4* 11.2* 10.9* 11.0*   HCT 38.1* 38.0* 36.3* 36.7*   MCV 81 82 81 81   MCH 24.4* 24.1* 24.4* 24.2*   MCHC 29.9* 29.5* 30.0* 30.0*   RDW 17.6* 17.9* 17.8* 17.8*    276 320 323     INR  Recent Labs   Lab 10/09/22  0445   INR 1.23*       A/P: Zachary Gan is a 82 year old male with a past medical history of PE, DVT, PVD, venous stasis ulcers, HTN, and HLD presenting as a transfer from OSH after he was intubated in the setting of respiratory distress following a four day course of cough and sore throat. There was initial concern regarding airway compression from a thyroid nodule seen on CT scan but this was felt to be noncontributory to acute presentation. May be more of a supraglottitis picture. Clinically improving and reports he is back to baseline.    Neuro:  - Pain control with Tylenol, oxy    HEENT:  - Decadron complete  - Flovent    CV:   - PTA lipitor, apixaban, Lasix    Pulm:  - Wean O2 as tolerated    Heme:  - LOYDA    ID:  - Augmentin for 7 days    Endo:  - Medium dose sliding scale insulin    GI:  - Soft and bite sized diet, cleared for thin liquids  - PPI, bowel reg    MSK:  - Venous stasis unchanged  - Appreciate lymphedema " recs    Consults:  - WOC, SLP, Nutrition, SW, PT, OT, Lymphedema    Ppx:  - SCDs, IS    Dispo:  - Anticipate in the next one-two days, TCU vs current assisted living. Consulting services favoring TCU.    -- Patient and above plan discussed with MD Hiwot Britton MD  Otolaryngology-Head & Neck Surgery PGY-1  Please contact ENT with questions by dialing * [phone] and entering job code [phone] when prompted.

## 2022-10-14 NOTE — PROGRESS NOTES
"CLINICAL NUTRITION SERVICES - REASSESSMENT NOTE   Nutrition Prescription    Malnutrition Status:    Patient does not meet two of the established criteria necessary for diagnosing malnutrition but is at risk for malnutrition    Recommendations already ordered by Registered Dietitian (RD):  Supplements:  Chocolate Magic Cup @ 2pm (also allow PRN)    Future/Additional Recommendations:  Monitor PO adequacy, tolerance and use of Magic Cups, wt trends     EVALUATION OF THE PROGRESS TOWARD GOALS   Diet:  Soft and bite-sized diet (IDDSI 6) & thin liquids (IDDSI 0) - SLP signed off 10/13 d/t no further therapy recommended. Pt does not have his dentures and has no way to obtain them here in the hospital. Pt has demonstrated he is unable to masticate very solid foods.    Oral Intake: Eating typically % meals per RN flowsheets (occasionally eating less: 50% of one meal on 10/11 and 10/12, 25% of one meal on 10/12). Pt reports \"ok\" appetite - he shares he has a \"bellyache\" and has been having diarrhea which he associates with current antibiotics. Pt denied needing to use the restroom despite stomach ache. He reports he had 2 hot cereals for breakfast this morning, yesterday he had eggs at breakfast. Pt agreeable to trying a chocolate Magic Cup for afternoon snack. Pt declined nutrition drinks, making a sour face when offered.     Nutrition Support: EN (briefly when intubated)  10/9-10/10: Osmolite 1.5 @ 10 mL/hr       NEW FINDINGS   -Wt trends: Continue dosing wt of 87 kg (based on 10/9 wt of 115 kg and IBW of 78 kg)   10/11/22 0220 116.9 kg (257 lb 11.5 oz) Bed scale   10/10/22 0400 114.9 kg (253 lb 4.9 oz) --   10/09/22 0500 114.9 kg (253 lb 4.9 oz) --      -Labs: Reviewed, notable for: BUN 24.7 (H, uptrended), Cr 1.21 (H, uptrended), eGFR 60 (L, downtrended)    -GI: 0-4 BMs daily per I/Os over past week - loose stools per RN flowsheets.    -Skin: Per most recent WOCN note on 10/10, initial assessment of RLE wounds d/t " venous ulcer, RLE wound d/t rubbing on bed.    -Meds & Vitamin/Mineral Supplementation: Reviewed, notable for: Culturell, Thera-Vit-M, Lasix    MALNUTRITION  % Intake: Decreased intake does not meet criteria  % Weight Loss: None noted  Subcutaneous Fat Loss: None observed  Muscle Loss: None observed - pt appears to have muscle tone appropriate for age  Fluid Accumulation/Edema: Does not meet criteria (ankle/foot edema from dermatitis)   Malnutrition Diagnosis: Patient does not meet two of the established criteria necessary for diagnosing malnutrition but is at risk for malnutrition    Previous Goals   Total avg nutritional intake to meet a minimum of 20 kcal/kg and 1.5 g PRO/kg daily (per dosing wt 87 kg).  Evaluation: Unable to evaluate - no longer on EN support, not on kcal cts    Previous Nutrition Diagnosis  Swallowing difficulty related to intubation as evidenced by OG placement and plan for trickle rate of enteral feeding    Evaluation: Resolved    CURRENT NUTRITION DIAGNOSIS  Predicted inadequate nutrient intake (pro/kcal) related to diet restrictions d/t edentulism and no dentures available, variable PO thus far this admit with potential for appetite to decline further and potential for GI upset to worsen and inhibit PO.      INTERVENTIONS  Implementation  -Medical food supplement therapy: Magic Cups  -Nutrition education: Encouraged PO, particularly protein for healing. Offered nutrition supplements.     Goals  Patient to consume % of nutritionally adequate meal trays TID, or the equivalent with supplements/snacks.    Monitoring/Evaluation  Progress toward goals will be monitored and evaluated per protocol.     Barbie Marrero RD, LD  Pager: [phone]

## 2022-10-14 NOTE — PROGRESS NOTES
Care Management Follow Up     Length of Stay (days): 5     Expected Discharge Date: Pt ready for discharge since 10/11/2022   Concerns to be Addressed: Disposition planning  Patient plan of care discussed at interdisciplinary rounds: Yes     Anticipated Discharge Disposition: Short term TCU placement     Anticipated Discharge Services:  Short term TCU placement  Anticipated Discharge DME:  Not applicable at this time     Patient/family educated on Medicare website which has current facility and service quality ratings:  Yes  Education Provided on the Discharge Plan:  yes    Patient/Family in Agreement with the Plan: yes     Referrals Placed by CM/SW: Post acute care facility's  Private pay costs discussed: Not applicable at this time     Additional Information:  Pt transferred to 6A from unit 6B.  A TCU stay is recommended and per Eloina Bullock (ENT P.A.), pt is medically ready for discharge.     The following facility's are not able to accept pt for admit:  - [facility], is full  - [facility] (Tony), per Tony, they have no anticipated openings through 9/16/2022  - [facility]'s in [place], no openings for at least the next 2 weeks  - [facility] (Peggy), states person who was suppose to leave [place] on 10/12/2022 did not and thus, they remain full  - [facility] (Kimmy), is full  - [facility] (Cornelia), full   - [facility] (Rowena), is not contracted with [facility] Medicare Advantage plans  - [facility] [place] (Bethany), is full  - [facility] (Ramandeep), Ramandeep states that they are not accepting any admits due to staffing     SW is awaiting decisions from:  - [facility] (Ghassan [phone]), spoke with Ghassan who states that he will have Chapis call this  in regards to whether or not they can accept pt for admit  - [facility] [place] (Lizett [phone]), SW left a  message for Lizett to call in regards to acceptance  - [facility] in [place] ([phone]), SW left a message for Admissions to call in regards to acceptance    SW received return call from:   - [facility] (Radha [phone]), Radha states that pt has a outstanding balance (from a previous admit) at [facility] of $1378.26.  Radha states that they will not consider pt for admit until the outstanding balance is paid off.     NORM received a return call from pt's sister (Deborah).  NORM updated Deborah in regards to the above.  Deborah confirms that she will phone [facility] to arrange for pt's outstanding bill to be paid.  NORM phoned Admissions (Radha) for [facility] and informed that pt's sister plans to arrange for pt's outstanding bill to be paid.  Radha confirms that she will contact this SW when she receives confirmation that the debt has been paid.     NORM will continue to follow for discharge planning.     BEV Hdez  Social Work, 6A  Phone:  [phone]  Pager:  [phone]  10/14/2022

## 2022-10-14 NOTE — PLAN OF CARE
Status: Admitted 10/9 from OSH with respiratory distress following 4 days of cough/sore throat, extubated 10/10. Initial concern was thyroid nodule seen on CT, found to noncontributory to acute presentation  Vitals: VSS, afebrile. HR in 50s during sleep at baseline. HR irregular.  Neuros: A/ox4. Hoarse speech. Strength 5/5 throughout. Blurry vision at baseline. C/o numbness to feet baseline.  IV: PIV Rt FA SL.  Labs/Electrolytes: AM labs. BG q4h, stable.  Resp/trach: LS clear, dim, on 2L oxymask, sats >95%.  Diet: Regular.  Bowel status: LBM 10/13, loose.  : Voids per urinal at bedside.  Skin: Redness blanchable to back, sacrum/coxxy, dusky/mercedez/blotchy to BLE w/ venous stasis ulcers, dry gauze dressing.  Pain: Denies  Activity: Turns/boosts in bed, up with 1A GB/FWW to b/r.   Plan: Continue current POC. Medically ready for discharge, pending discharge to TCU.   Updates this shift: none

## 2022-10-15 NOTE — PROGRESS NOTES
"Otolaryngology Progress Note    S: NAEON. Patient feels well. Having diarrhea and eager to leave the hospital.    O: /72 (BP Location: Right arm)   Pulse 71   Temp 97.7  F (36.5  C) (Oral)   Resp 18   Ht 1.803 m (5' 10.98\")   Wt 116.9 kg (257 lb 11.5 oz)   SpO2 93%   BMI 35.96 kg/m    General: Resting comfortably  HEENT: EOM intact, pupils reactive, neck soft and nontender without masses. Voice at baseline. No trouble swallowing.   Pulm: Breathing comfortably on 1-2 LPM NCO2, no stridor or stertor. No audible secretions.  CV: Extremities warm and well-perfused. BLE wrapped, lymphedema unchanged.  Neuro: CNII-XII intact, no focal deficits.    CBC  Recent Labs   Lab 10/15/22  0656 10/14/22  0615 10/13/22  0657 10/12/22  0655   WBC 11.1* 11.1* 11.5* 11.1*   RBC 4.72 4.68 4.65 4.46   HGB 11.4* 11.4* 11.2* 10.9*   HCT 38.6* 38.1* 38.0* 36.3*   MCV 82 81 82 81   MCH 24.2* 24.4* 24.1* 24.4*   MCHC 29.5* 29.9* 29.5* 30.0*   RDW 17.5* 17.6* 17.9* 17.8*    279 276 320     INR  Recent Labs   Lab 10/09/22  0445   INR 1.23*       A/P: Zachary Gan is a 82 year old male with a past medical history of PE, DVT, PVD, venous stasis ulcers, HTN, and HLD presenting as a transfer from OSH after he was intubated in the setting of respiratory distress following a four day course of cough and sore throat. There was initial concern regarding airway compression from a thyroid nodule seen on CT scan but this was felt to be noncontributory to acute presentation. May be more of a supraglottitis picture. Clinically improving and reports he is back to baseline. Diarrhea likely secondary to antibiotics as onset coincides     Neuro:  - Pain control with Tylenol, oxy    HEENT:  - Decadron complete  - Flovent    CV:   - PTA lipitor, apixaban, Lasix    Pulm:  - Wean O2 as tolerated    Heme:  - LOYDA    ID:  - Discontinued Augmentin    Endo:  - Medium dose sliding scale insulin    GI:  - Soft and bite sized diet, cleared for " thin liquids  - PPI, bowel reg    MSK:  - Venous stasis unchanged  - Appreciate lymphedema recs    Consults:  - WOC, SLP, Nutrition, SW, PT, OT, Lymphedema    Ppx:  - SCDs, IS    Dispo:  - Anticipate in the next one-two days, TCU vs current assisted living. Consulting services favoring TCU. Waiting for placement.     -- Patient and above plan discussed with MD Hiwot Britton MD  Otolaryngology-Head & Neck Surgery PGY-1  Please contact ENT with questions by dialing [phone] and entering job code [phone] when prompted.

## 2022-10-15 NOTE — PLAN OF CARE
Status: Admitted 10/9 from OSH with respiratory distress. Extubated 10/10.   Vitals: VSS on RA this shift. Oxymask 1-2L previously needed.  Neuros: A&O x4. 5/5 throughout. Blurry vision d/t not having glasses.  IV: PIV SL  Labs/Electrolytes: WNL  Resp/trach: Infrequent, congested cough. Coarse LS  Diet: Regular diet. Needs help ordering. Good PO  Bowel status: Diarrhea likely d/t antibiotics. LBM 10/15  : Voiding spntaneously  Skin: BLE venous stasis ulcers- dressing changed this shift. Blayne BLE.   Pain: Denies  Activity: A1, GB, walker. Up in chair x2. Walked halls x1.  Social: No calls or visitors this shift  Plan: Encourage walks in the [place]. Pending TCU placement.  Updates this shift: PO antibiotics discontinued.

## 2022-10-15 NOTE — PLAN OF CARE
Status: admitted from OSH on 10/9 for respiratory distress, extubated on 10/10.  Vitals: VSS on RA ex hypertensive within SBP parameters of 170. Intermittently marvin  Neuros: A&Ox4. Denies N/T. Strengths 5/5 throughout  IV: PIV SL  Labs/Electrolytes: BG checks   Resp/trach: on 1 L NC during the day. Currently on oxymask at 2 LPM   Diet: Regular  Bowel status: BM 10/14  : Voiding spontaneously  Skin: BLE venous stasis ulcers, dressing intact  Pain: denies  Activity: A1 GB, walker.   Plan: SW following for TCU placement. Continue to monitor and follow poc.

## 2022-10-15 NOTE — PROVIDER NOTIFICATION
ENT paged @ [phone] TIERRA Gan  do you want me to give last dose of amoxicillin this AM? saw order was discontinued. pt does not want to take bc he says it gives him diarrhea.   timothy Geller RN [phone]    Call back @ 0475: okay to not give AM dose.

## 2022-10-15 NOTE — PLAN OF CARE
Status: admitted from OSH on 10/9 for respiratory distress, extubated on 10/10.  Vitals: VSS on RA ex HTN within SBP parameters of 170. Intermittently marvin  Neuros: A&Ox4. Denies N/T. Strengths 5/5 throughout  IV: PIV SL  Labs/Electrolytes: BG checks q4hrs  Resp/trach: Using oxymask at 1L sating in mid-90s  Diet: Regular  Bowel status: BM 10/14  : Voiding spontaneously  Skin: BLE venous stasis ulcers, dressing intact  Pain: denies  Activity: A1 GB, walker.   Plan: SW following for TCU placement. Continue to monitor and follow poc.

## 2022-10-16 NOTE — PLAN OF CARE
Goal Outcome Evaluation:.    [id number]  Patient alert and oriented x4. Able to make needs known. Denies pain or shortness of breath. VSS on RA.  and 157, given schedule insulin per sliding scale. Urinal at the beside, good output, and one medium BM. Regular diet. Encourage to drink fluids and to walk outside room and refused. BLE dressings for venous stasis ulcers, clean and intact. Blayne BLE. Will continue to monitor.

## 2022-10-16 NOTE — PLAN OF CARE
Care from [id number]    Status: Admitted 10/9 from OSH with respiratory distress. Extubated 10/10.   Vitals: VSS on RA  Neuros: A&O x4. 5/5 throughout. Blurry vision d/t not having glasses.  IV: PIV SL  Labs/Electrolytes: WNL  Resp/trach: Infrequent, congested cough. Coarse LS  Diet: Regular diet. Needs help ordering. Good PO  Bowel status: LBM 10/16. Has been having diarrhea but becoming more solid.  : Voiding spntaneously  Skin: BLE venous stasis ulcers- dressing changed this shift. Blayne BLE.   Pain: Denies  Activity: A1, GB, walker. Up in chair x3. Walked halls x2.  Social: Daughter talked to patient on phone. Writer encourage patient to call his wife but she did not .  Plan: Encourage walks in the [place]. Pending TCU placement.  Updates this shift: Shower and linen change completed.    Blood sugar checks discontinued as patient was not requiring insulin. MAR insulin order was not discontinued, though.

## 2022-10-16 NOTE — PLAN OF CARE
Status: Admitted 10/9 from OSH w/ respiratory distress  Vitals: VSS  Neuros: A/Ox4, Str 5/5, wears glasses at baseline  IV: PIV SL   Labs/Electrolytes: BG q4hrs  Resp/trach: RA   Diet: regular   Bowel status: [facility] 10/15  : Voiding spontaneously, urinal at bedside   Skin: BLE venous stasis ulcers   Pain: denies   Activity: Ax1, GB, walker  Plan: Continue to monitor

## 2022-10-16 NOTE — PROGRESS NOTES
"Otolaryngology Progress Note    S: NAEON. No breathing concerns. Diarrhea improving now that abx discontinued. Remains very eager to get home.      O: BP (!) 162/82 (BP Location: Right arm)   Pulse 89   Temp 97  F (36.1  C) (Oral)   Resp 18   Ht 1.803 m (5' 10.98\")   Wt 116.9 kg (257 lb 11.5 oz)   SpO2 95%   BMI 35.96 kg/m    General: Resting comfortably  HEENT: EOM intact, pupils reactive, neck soft and nontender without masses. Voice at baseline. No trouble swallowing.   Pulm: Breathing comfortably on RA, no stridor or stertor. No audible secretions.  CV: Extremities warm and well-perfused. BLE wrapped      A/P: Zachary C Malik is a 82 year old male with a past medical history of PE, DVT, PVD, venous stasis ulcers, HTN, and HLD presenting as a transfer from OSH after he was intubated in the setting of respiratory distress following a four day course of cough and sore throat. There was initial concern regarding airway compression from a thyroid nodule seen on CT scan but this was felt to be noncontributory to acute presentation. May be more of a supraglottitis picture. Clinically back to baseline, completed abx course.     Neuro: prn Tylenol  HEENT: s/p Decadron. BID flovent   CV: HDS; PTA lipitor, apixaban, Lasix  Pulm: supplemental O2 as indicated   Heme: LOYDA, PTA apixiban   ID: s/p unasyn and augmentin  Endo: monitoring BG, getting medium dose sliding scale insulin  GI: Soft and bite sized diet, cleared for thin liquids per SLP. On PPI and probiotic   MSK: lymphedema WOC following for venous stasis change to LE   Consults:  WOC, SLP, Nutrition, SW, PT, OT, Lymphedema  - Working on strength with RN team, will have PT and OT re-assess  Ppx: SCDs, IS    Dispo: Remains medically ready for discharge. Waiting for placement.     -- Patient and above plan to be discussed with MD Gloria Britton MD     "

## 2022-10-17 NOTE — PLAN OF CARE
Lymphedema: LE's briefly inspected, though wound dressings in place on bilateral calves. Patient presents with firm edema, though denies that his edema is severe. Patient firmly declines compression to LE's at this time despite education on purpose and benefits for wound healing. Encouraged elevation and muscle pump as conservative strategies. Will follow up again in a day or two, and consider completing orders if patient continues to decline lymphedema services.

## 2022-10-17 NOTE — PROGRESS NOTES
"Otolaryngology Progress Note    S: NAEON. No breathing concerns. Patient eager to return home. When mentioning recommendation for TCU placement, patient expressed desire to be discharged home instead.     O: BP (!) 141/63 (BP Location: Right arm)   Pulse 69   Temp 97  F (36.1  C) (Oral)   Resp 18   Ht 1.803 m (5' 10.98\")   Wt 116.9 kg (257 lb 11.5 oz)   SpO2 93%   BMI 35.96 kg/m    General: Resting comfortably  HEENT: EOM intact, pupils reactive. Voice at baseline. No trouble swallowing.   Pulm: Breathing comfortably on RA, no stridor or stertor. No audible secretions.  CV: Extremities warm and well-perfused. BLE wrapped    A/P: Cullman C Malik is a 82 year old male with a past medical history of PE, DVT, PVD, venous stasis ulcers, HTN, and HLD presenting as a transfer from OSH after he was intubated in the setting of respiratory distress following a four day course of cough and sore throat. There was initial concern regarding airway compression from a thyroid nodule seen on CT scan but this was felt to be noncontributory to acute presentation. May be more of a supraglottitis picture. Clinically back to baseline, completed abx course.     Neuro: prn Tylenol  HEENT: s/p Decadron. BID flovent   CV: HDS; PTA lipitor, apixaban, Lasix  Pulm: supplemental O2 as indicated   Heme: LOYDA, PTA apixiban, switching CBC from every day to M/W/F  ID: s/p unasyn and augmentin  Endo: BG monitoring, getting medium dose sliding scale insulin  GI: Soft and bite sized diet, cleared for thin liquids per SLP. On PPI and probiotic   MSK: lymphedema WOC following for venous stasis change to LE   Consults:  WOC, SLP, Nutrition, SW, PT, OT, Lymphedema  - Working on strength with RN team, OT scheduled to reassess today  Ppx: SCDs, IS    Dispo: Remains medically ready for discharge. Waiting for placement.     -- Patient and above plan to be discussed with MD Valente Britton,     The documentation recorded by the above " mentioned medical student accurately reflects the services I personally performed and the decisions made by me.    Dorothea Nava MD  Otolaryngology-Head & Neck Surgery PGY2

## 2022-10-17 NOTE — PROGRESS NOTES
"Care Management Follow Up     Length of Stay (days): 8     Expected Discharge Date: Pt ready for discharge since 10/11/2022   Concerns to be Addressed: Disposition planning  Patient plan of care discussed at interdisciplinary rounds: Yes     Anticipated Discharge Disposition: Short term TCU placement     Anticipated Discharge Services:  Short term TCU placement  Anticipated Discharge DME:  Not applicable at this time     Patient/family educated on Medicare website which has current facility and service quality ratings:  Yes  Education Provided on the Discharge Plan:  yes    Patient/Family in Agreement with the Plan: yes     Referrals Placed by CM/SW: Post acute care facility's  Private pay costs discussed: Not applicable at this time     Additional Information:  Pt transferred to  from unit 6B.  A TCU stay is recommended and per ENT pt is medically ready for discharge.  NORM received a call from Admissions (Radha) for [facility].  Radha states that pt's outstanding debt at [facility] has been paid and they will accept pt for admit.  Radha states that she will seek prior auth from insurance.  NORM updated pt's sister (Deborah).  NORM updated pt. NORM spoke with pt's floor nurse (Ana Luisa) who indicates that pt can travel by w/c for transport.  NORM completed \"Important Message from Medicare\" with pt's sister (Ana Luisa) via phone call.  NORM updated Dr. Mullen (ENT).       NORM will coordinate discharge to [facility] when confirmation of receipt of prior auth from insurance is received.          BEV Hdez  Social Work, 6A  Phone:  [phone]  Pager:  [phone]  10/17/2022     "

## 2022-10-17 NOTE — PLAN OF CARE
Status: Admitted 10/9 from OSH with respiratory distress. Extubated 10/10.   Vitals: VSS on RA  Neuros: A&O x4. 5/5 throughout. Blurry vision d/t not having glasses.  IV: PIV SL  Labs/Electrolytes: WNL  Resp/trach: Infrequent, congested cough. Coarse LS  Diet: Level 6 diet. Needs help ordering. Good PO  Bowel status: LBM 10/17.  : Voiding spntaneously  Skin: BLE venous stasis ulcers- dressing changed this shift. Blayne BLE.   Pain: Denies  Activity: A1, GB, walker. Up in chair x2. Walked halls x1.  Plan: Encourage walks in the carbajal. Medically ready to discharge. Discharge to [facility] pending prior authorization. See SW note.

## 2022-10-17 NOTE — PROGRESS NOTES
"[facility]  WO Nurse Inpatient Assessment     Consulted for: bilateral LE wounds     Patient History (according to provider note(s):      82 year old male with hx of DVT, PE on apixiban, PVD, HTN, who was transferred from OSH after he required intubation for respiratory distress relating to a thyroid nodule. Likely epiglottis given 4 days of coughing and sore throat prior to presentation to OSH.      Areas Assessed:      Areas visualized during today's visit: Focused: and Lower extremities   Bilaterally: PP 2/4, appropriately warm from toes to knees, minimal to 1+ pitting edema, dark purple/brown hemosiderin staining from ankles to knees.      Wound location: RLE      Date of photos: 10/10/22 and 10/17/22  Wound due to: Venous Ulcer  Wound history/plan of care: pt believes wounds due to ACE wraps.  Currently with adaptic, roll gauze.  Pt is adamant about keeping any compression off  Wounds are venous stasis ulcers     Wound base: mixture of dermis and fibrin.  7 x 9 cm band of wounds on anterior gator area with increased epithelium between wounds.  Scattered dry fibrinous scabs on LE       Palpation of the wound bed: normal      Drainage: small     Description of drainage: serosanguinous    Odor: mild  Pain: mild,     Wound location: RLE      Date of photos: 10/10/22 and 10/17/22  Wound due to: per pt, wound \"from rubbing on the bed\"   Wound is on posterior LE, immediately superior to achilles   Wound history/plan of care: currently with adaptic, kerlix and ACE wrap  Wound base: 100 % dermis, moist and pink    Palpation of the wound bed: normal      Drainage: scant     Description of drainage: serous     Measurements (length x width x depth, in cm): 4  x 1.2  x  0.2 cm     Periwound skin: Erythema- blanchable  Resolved      Odor: mild  Pain: mild,   Pain interventions prior to dressing change: N/A  Treatment goal: Heal   STATUS: improving  Supplies ordered: gathered and " supplies stored on unit     Treatment Plan:     Bilateral LE wounds: daily  Cleanse wounds and skin with wound cleanser and dry gauze.  Pat dry   Cover intact skin with sween 24 if pt allows, or vaseline if he c/o burning   Cover wounds with vaseline gauze.   Cover vaseline gauze with appropriate amt of dry gauze or ABD pads-enough to contain drainage for 24 hours.   Secure with kerlix roll gauze.        Orders: Reviewed and Written    RECOMMEND PRIMARY TEAM ORDER: Lymphedema consult  Education provided: plan of care, wound progress and need for compression   Discussed plan of care with: Patient and Nurse  WOC nurse follow-up plan: weekly  Notify WOC if wound(s) deteriorate.  Nursing to notify the Provider(s) and re-consult the WOC Nurse if new skin concern.    DATA:     Current support surface: Standard  Low air loss mattress  BMI: Body mass index is 35.96 kg/m .   Active diet order: Orders Placed This Encounter      Soft & Bite Sized Diet (level 6) Thin Liquids (level 0)      Diet     Output: I/O last 3 completed shifts:  In: 500 [P.O.:500]  Out: 800 [Urine:800]     Labs:   Recent Labs   Lab 10/17/22  0644   HGB 11.6*   WBC 11.1*     Pressure injury risk assessment:   Sensory Perception: 3-->slightly limited  Moisture: 4-->rarely moist  Activity: 3-->walks occasionally  Mobility: 3-->slightly limited  Nutrition: 3-->adequate  Friction and Shear: 3-->no apparent problem  Costa Score: 19    [facility]  Phone: [phone]  Pager: [phone]

## 2022-10-17 NOTE — PROGRESS NOTES
Care Management Follow Up     Length of Stay (days): 8     Expected Discharge Date: Pt ready for discharge since 10/11/2022   Concerns to be Addressed: Disposition planning  Patient plan of care discussed at interdisciplinary rounds: Yes     Anticipated Discharge Disposition: Short term TCU placement     Anticipated Discharge Services:  Short term TCU placement  Anticipated Discharge DME:  Not applicable at this time     Patient/family educated on Medicare website which has current facility and service quality ratings:  Yes  Education Provided on the Discharge Plan:  yes    Patient/Family in Agreement with the Plan: yes     Referrals Placed by CM/SW: Post acute care facility's  Private pay costs discussed: Not applicable at this time     Additional Information:  Pt transferred to 6A from unit 6B.  A TCU stay is recommended and per Eloina Bullock (ENT P.A.), pt is medically ready for discharge.     The following facility's are not able to accept pt for admit:  - [facility], is full  - [facility] ([facility]), per [facility], they have no anticipated openings through 9/16/2022  - [facility] in [place], no openings for at least the next 2 weeks  - [facility] (Peggy), states person who was suppose to leave [place] on 10/12/2022 did not and thus, they remain full  - [facility] (Kimmy), is full  - [facility] (Cornelia), full   - [facility] (Rowena), is not contracted with [facility] Medicare Advantage plans  - [facility] [place] (Bethany), is full  - [facility] (Ramandeep), Ramandeep states that they are not accepting any admits due to staffing     SW phoned Admissions at the following facility's and left messages for representatives to call in regards to acceptance:    - [facility] ([place] [phone])  - [facility] in [place] ([place] [phone]).    NORM faxed current MAR, MD and nursing  progress notes to the following SNF (SW did not fax current therapy notes as they were not available:  - [facility] in [place] ([facility] [phone]),      SW left a message for Admissions at the following facility inquiring as to whether or not outstanding balance was paid off and if so, whether or not they can accept pt for admit today:     - [facility] (Radha [phone]).     NORM will continue to follow for discharge planning.      BEV Hdez  Social Work, 6A  Phone:  [phone]  Pager:  [phone]  10/17/2022

## 2022-10-17 NOTE — PLAN OF CARE
Status: Admitted 10/9 from OSH with respiratory distress. Extubated 10/10.   Vitals: VSS on RA  Neuros: A&O x4. 5/5 throughout. Blurry vision d/t not having glasses.  IV: PIV SL  Labs/Electrolytes: WNL  Resp/trach: on RA  Diet: Regular diet. Needs help ordering.   Bowel status: LBM 10/16. Has been having diarrhea but becoming more solid.  : Voiding spontaneously via urinal  Skin: BLE venous stasis ulcers- dressing changed yesterday evening. Blayne BLE.   Pain: Denies  Activity: A1, GB, walker.   Plan: Encourage walks in the [place]. Pending TCU placement.

## 2022-10-18 NOTE — PLAN OF CARE
Status: Admitted 10/9 from OSH with respiratory distress. Extubated 10/10.   Vitals: VSS, afebrile, ex requiring 2L NC to keep sats >90%.  Neuros: A/ox4. Strength 5/5 throughout. Numbness to feet at baseline. Blurry vision, unchanged, glasses unavailable.  IV: Patent SL.  Labs/Electrolytes: WNL  Resp/trach: LS clear, dim, on 2L NC during sleep.  Diet: Regular.  Bowel status: LBM 10/17  : Voids spontaneously to urinal at bedside.  Skin: BLE venous stasis ulcers. Blayne to BLE. Daily dressing changes. Generalized bruising.   Pain: Denies  Activity: 1A FWW GB  Plan: Discharge to [facility] [place] pending PA.  Updates this shift: None

## 2022-10-18 NOTE — PLAN OF CARE
Status: Admitted 10/9 from OSH with respiratory   p sats >90%.  Neuros: A/ox4. Strength 5/5 throughout. Numbness to feet at baseline. Blurry vision, unchanged, glasses unavailable.  IV: Patent SL.  Labs/Electrolytes: WNL  Resp/trach: LS clear, dim, on 2L NC during sleep.  Diet: Regular.  Bowel status: LBM 10/17  : Voids spontaneously to urinal at bedside.  Skin: BLE venous stasis ulcers. Blayne to BLE. Daily dressing changes. Generalized bruising.   Pain: Denies  Activity: 1A FWW GB  Plan: Discharge to [facility] [place] pending PA.  Updates this shift: None

## 2022-10-18 NOTE — PROGRESS NOTES
"Otolaryngology Progress Note    S: NAEON. No breathing concerns. Patient was accepted by [facility], discharge pending prior auth approval.     O: /67 (BP Location: Right arm)   Pulse 68   Temp 99.4  F (37.4  C) (Oral)   Resp 16   Ht 1.803 m (5' 10.98\")   Wt 116.9 kg (257 lb 11.5 oz)   SpO2 94%   BMI 35.96 kg/m    General: Resting comfortably  HEENT: Voice at baseline. No trouble swallowing.   Pulm: Breathing comfortably on RA, no stridor or stertor. No audible secretions.  CV: Extremities warm and well-perfused. BLE wrapped    A/P: [place] C Malik is a 82 year old male with a past medical history of PE, DVT, PVD, venous stasis ulcers, HTN, and HLD presenting as a transfer from OSH after he was intubated in the setting of respiratory distress following a four day course of cough and sore throat. There was initial concern regarding airway compression from a thyroid nodule seen on CT scan but this was felt to be noncontributory to acute presentation. May be more of a supraglottitis picture. Clinically back to baseline, completed abx course.     Neuro: prn Tylenol  HEENT: s/p Decadron. BID flovent   CV: HDS; PTA lipitor, apixaban, Lasix  Pulm: supplemental O2 as indicated   Heme: LOYDA, PTA apixiban, switching CBC from every day to M/W/F  ID: s/p unasyn and augmentin  Endo: BG monitoring, getting medium dose sliding scale insulin  GI: Soft and bite sized diet, cleared for thin liquids per SLP. On PPI and probiotic   MSK: lymphedema WOC following for venous stasis change to LE   Consults:  WOC, SLP, Nutrition, SW, PT, OT, Lymphedema  Ppx: SCDs, IS     Dispo: Remains medically ready for discharge. Waiting for placement.     -- Patient and above plan to be discussed with MD Dorothea Britton MD  Otolaryngology-Head & Neck Surgery PGY2    "

## 2022-10-18 NOTE — PLAN OF CARE
Status: Admitted 10/9 from OSH with respiratory distress. Extubated 10/10.   Vitals: VSS on RA  Neuros: A&O x4. 5/5 throughout. Blurry vision d/t not having glasses.  IV: PIV SL  Labs/Electrolytes: WNL  Resp/trach: On RA, lung sound diminished,  Infrequent, congested cough.   Diet: Level 6 diet. Needs help ordering. Good PO  Bowel status: LBM 10/17.  : Voiding spntaneously  Skin: BLE venous stasis ulcers, dressing changed AM shift. Blayne BLE.   Pain: Denies  Activity: A1, GB, walker.   Plan: Discharge to \[facility]\"" at [place] pending prior authorization. See SW note

## 2022-10-18 NOTE — PROGRESS NOTES
Care Management Follow Up    Length of Stay (days): 9    Expected Discharge Date:  10/18/2022     Concerns to be Addressed: Disposition planning  Patient plan of care discussed at interdisciplinary rounds: Yes    Anticipated Discharge Disposition: Short term TCU placement     Anticipated Discharge Services:    Short term TCU placement  Anticipated Discharge DME:  Not applicable    Patient/family educated on Medicare website which has current facility and service quality ratings:  yes  Education Provided on the Discharge Plan:  yes  Patient/Family in Agreement with the Plan: yes    Referrals Placed by CM/SW: Post acute care facility's  Private pay costs discussed:  Not applicable at this time    Additional Information:  SW is following for discharge planning. TCU placement is recommended.  Per ENT, pt is medically ready for discharge.  Pt has been assessed and [facility] [place] pending receipt of prior auth from insurance.  SW phoned Admissions (Radha) for [facility] this am and left a message asking that she call when auth received so that discharge arrangements can be made.     NORM will continue to follow for discharge planning.      BEV Hdez  Social Work, 6A  Phone:  [phone]  Pager:  [phone]  10/18/2022          CARLITOS Nelson

## 2022-10-19 NOTE — PLAN OF CARE
Status: Admitted 10/9 from OSH with respiratory distress. Extubated 10/10.   Vitals: VSS, afebrile, ex requiring 2L NC to keep sats >90%.  Neuros: A/ox4. Strength 5/5 throughout. Numbness to feet at baseline. Blurry vision, unchanged, glasses unavailable.  IV: Patent SL.  Labs/Electrolytes: WNL  Resp/trach: LS clear, dim, on 2L NC during sleep.  Diet: Regular.  Bowel status: LBM 10/18  : Voids spontaneously to urinal at bedside.  Skin: BLE venous stasis ulcers. Blayne to BLE. Daily dressing changes. Generalized bruising.   Pain: Denies  Activity: 1A FWW GB  Plan: Discharge to [facility] [place] pending PA.  Updates this shift: None

## 2022-10-19 NOTE — PROGRESS NOTES
"Otolaryngology Progress Note    S: NAEON. No breathing concerns.     O: /70 (BP Location: Right arm)   Pulse 72   Temp (!) 96.6  F (35.9  C) (Axillary)   Resp 16   Ht 1.803 m (5' 10.98\")   Wt 116.9 kg (257 lb 11.5 oz)   SpO2 96%   BMI 35.96 kg/m    General: Resting comfortably  HEENT: Voice at baseline. No trouble swallowing.   Pulm: Breathing comfortably on RA, no stridor or stertor. No audible secretions.  CV: Extremities warm and well-perfused. BLE wrapped    A/P: [place] C Malik is a 82 year old male with a past medical history of PE, DVT, PVD, venous stasis ulcers, HTN, and HLD presenting as a transfer from OSH after he was intubated in the setting of respiratory distress following a four day course of cough and sore throat. There was initial concern regarding airway compression from a thyroid nodule seen on CT scan but this was felt to be noncontributory to acute presentation.    Neuro: prn Tylenol  HEENT: s/p Decadron. BID flovent   CV: HDS; PTA lipitor, apixaban, Lasix  Pulm: supplemental O2 as indicated   Heme: LOYDA, PTA apixiban, switching CBC from every day to M/W/F  ID: s/p unasyn and augmentin  Endo: BG monitoring, getting medium dose sliding scale insulin  GI: Soft and bite sized diet, cleared for thin liquids per SLP. On PPI and probiotic   MSK: lymphedema WOC following for venous stasis change to LE   Consults:  WOC, SLP, Nutrition, SW, PT, OT, Lymphedema  Ppx: SCDs, IS     Dispo: Remains medically ready for discharge. Waiting for placement.     -- Patient and above plan to be discussed with MD Zachary Britton MD    "

## 2022-10-19 NOTE — PLAN OF CARE
"Assumed cares: [id number]    Events: No change during shift. Pt medically stable and awaiting prior authorization for discharge to facility. Napping most of the day.     VS: VSS on RA during the day. Due to sleep apnea, pt requiring 2L Nasal cannula at night. /58 (BP Location: Right arm)   Pulse 91   Temp (!) 96.5  F (35.8  C) (Oral)   Resp 18   Ht 1.803 m (5' 10.98\")   Wt 116.9 kg (257 lb 11.5 oz)   SpO2 95%   BMI 35.96 kg/m     IV: PIV saline locked.   Neuro: A&Ox4. Irritable when discussing medications and awaiting discharge.   GI/: Voiding adequately. Last BM yesterday.   Nutrition: Good appetite.   Skin: BLE dressings changed.   Pain: Pt denies pain except w/ in lower legs w/ dressing changes and w/ poor positioning. Pillows applied under heels w/ adequate relief.   Activity: Pt up assist x1 w/ walker.     Plan: Per SW note, pt waiting for prior authorization for discharge to [facility].     "

## 2022-10-19 NOTE — PLAN OF CARE
Status: Admitted 10/9 from OSH with respiratory distress. Extubated 10/10.   Vitals: VSS on 1 L NC  Neuros: A&O x4. 5/5 throughout. Blurry vision d/t not having glasses.  IV: PIV SL  Labs/Electrolytes: WNL  Resp/trach: On RA, lung sound diminished,  Infrequent, congested cough.   Diet: Level 6 diet. Needs help ordering. Good PO  Bowel status: LBM 10/18 small.  : Voiding spntaneously  Skin: BLE venous stasis ulcers, dressing changed this shift. Blayne BLE.   Pain: Denies  Activity: A1, GB, walker.   Plan: Discharge to \[facility]\"" at [place] pending prior authorization. See SW note

## 2022-10-19 NOTE — PROGRESS NOTES
SPIRITUAL HEALTH SERVICES Progress Note  [facility] ([place]) 6A    I met with Zachary per length of stay to introduce SHS. He declined SHS needs at this time.    Cameron Doan  Chaplain Resident  Pager [phone]      * SHS remains available 24/7 for emergent requests/referrals, either by having the switchboard page the on-call  or by entering an ASAP/STAT consult in Epic (this will also page the on-call ). Routine Epic consults receive an initial response within 24 hours.*

## 2022-10-19 NOTE — PROGRESS NOTES
Care Management Follow Up     Length of Stay (days): 10     Expected Discharge Date:  10/19/2022     Concerns to be Addressed: Disposition planning  Patient plan of care discussed at interdisciplinary rounds: Yes     Anticipated Discharge Disposition: Short term TCU placement     Anticipated Discharge Services:    Short term TCU placement  Anticipated Discharge DME:  Not applicable     Patient/family educated on Medicare website which has current facility and service quality ratings:  yes  Education Provided on the Discharge Plan:  yes  Patient/Family in Agreement with the Plan: yes     Referrals Placed by CM/SW: Post acute care facility's  Private pay costs discussed:  Not applicable at this time     Additional Information:  SW is following for discharge planning. TCU placement is recommended.  Per ENT, remains medically ready for discharge.  Pt has been assessed and approved for admit to [facility] pending receipt of prior auth from insurance.  On 10/18/2022, SW received a call from Admissions (Radha) for [facility] indicating that pt's insurance requested add'l clinical information which she faxed on 10/18/2022.   SW phoned Admissions (Shannan) for [facility] this am and left a message requesting a status update in regards to pursuit of prior auth from insurance.  SW phoned pt's sister (Deborah) and updated.        SW will continue to follow for discharge planning.        BEV Hdez  Social Work, 6A  Phone:  [phone]  Pager:  [phone]  10/19/2022

## 2022-10-20 NOTE — PLAN OF CARE
Edema: Pt with minimal swelling in renata LEs. Pt also not agreeable to compression at any time. Pt educated on non compressive techniques for edema mgmt. Will complete IP edema order.

## 2022-10-20 NOTE — PROGRESS NOTES
"Otolaryngology Progress Note    S: NAEON. No breathing concerns.     O: /62 (BP Location: Right arm)   Pulse 88   Temp 98  F (36.7  C) (Axillary)   Resp 16   Ht 1.803 m (5' 10.98\")   Wt 116.9 kg (257 lb 11.5 oz)   SpO2 94%   BMI 35.96 kg/m    General: Resting comfortably  HEENT: Voice at baseline. No trouble swallowing.   Pulm: Breathing comfortably on RA, no stridor or stertor. No audible secretions.    A/P: [place] C Malik is a 82 year old male with a past medical history of PE, DVT, PVD, venous stasis ulcers, HTN, and HLD presenting as a transfer from OSH after he was intubated in the setting of respiratory distress following a four day course of cough and sore throat. There was initial concern regarding airway compression from a thyroid nodule seen on CT scan but this was felt to be noncontributory to acute presentation. Medically stable and appropriate for discharge.    Neuro: prn Tylenol  HEENT: s/p Decadron. BID flovent   CV: HDS; PTA lipitor, apixaban, Lasix  Pulm: supplemental O2 as indicated   Heme: LOYDA, PTA apixiban  ID: s/p unasyn and augmentin  Endo: BG monitoring, getting medium dose sliding scale insulin  GI: Soft and bite sized diet, cleared for thin liquids per SLP. On PPI and probiotic   MSK: lymphedema WOC following for venous stasis change to LE   Consults:  WOC, SLP, Nutrition, SW, PT, OT, Lymphedema  Ppx: SCDs, IS     Dispo: Remains medically ready for discharge. Waiting for placement.     -- Patient and above plan to be discussed with MD Zachary Britton MD    "

## 2022-10-20 NOTE — PROGRESS NOTES
Care Management Follow Up    Length of Stay (days): 11    Expected Discharge Date: 10/21/2022     Concerns to be Addressed: discharge planning     Patient plan of care discussed at interdisciplinary rounds: Yes    Anticipated Discharge Disposition: Return to [facility]  Anticipated Discharge Services:  Home care  Anticipated Discharge DME:  None    Patient/family educated on Medicare website which has current facility and service quality ratings:  No  Education Provided on the Discharge Plan:  Yes  Patient/Family in Agreement with the Plan: Yes    Referrals Placed by CM/SW: External Care Coordination  Private pay costs discussed: Not applicable    Additional Information:  Informed by 6A NORM that patient's insurance has denied approval for a TCU stay. Patient is currently SBA for all transfers, appears to be nearing his baseline. Patient requires assistance with dressing lower body, socks/shoes and showering.     Call placed to Leeanne (ph: [phone]) at [facility] to talk about patient returning. Leeanne states patient was receiving assistance with dressing lower body, socks/shoes and showering prior to hospitalization so there are no concerns there. Leeanne does however express concern about patient requiring SBA for all transfers. Prior to hospitalization patient was independent with transfers. Leeanne states they are unable to increase his services to provide SBA at this time.     Therapy plans to work with patient again today. I have asked them to further clarify patients level of assistance with mobility to relay back to Leeanne. She will then better be able to determine if they are able to accept him back.     If patient is truly at baseline and able to return to [facility], this could not happen today. Leeanne states possibly tomorrow but she will need to verify with her nurse manager and get back to us. She states they would prefer return does not happen over the weekend due to no nurses on staff. On the day of  "discharge, she asks that patient returns prior to supper time. She would like discharge medication send to [facility] in [place] (ph: [phone]). She would like recent therapy notes, RN notes, discharge summary and discharge orders sent to fax: [phone]. She would like nurse to nurse report called to [phone].     Call placed to patient's daughter, Deborah (ph: [phone]) to update on return to [facility]. Deborah states she will be able to provide transportation back. RNCC will keep her updated on return date/time.    Call placed to [facility] to inquire about therapy and HHA services being added. Per Sondra at [facility], patient is currently open for RN/PT/OT; adding HHA would not be an issue. She just asked resumption order specify \"resume RN/PT/OT, add HHA.\"     1262 Addendum:   Physical therapy worked with patient this afternoon and states patient is ambulating independently with a walker. OT states patient is toileting independently. Spoke with Leeanne at [facility] who agrees this is patient's baseline and they can accept him back. She states after speaking with her nurse manager, Monday is the soonest they will be able to take him back. Spoke with sister Deborah who plans to give him a ride. Patient, provider, home care agency updated.     [facility] - open for RN/PT/OT, HHA added  Ph: [phone]  Fax: [phone]    [facility]  [address] [place] [address]  Contact: Leeanne  Ph: [phone]  Discharge pharmacy: [facility] in [place] (ph: [phone])  Discharge fax#: [phone] (therapy notes, RN notes, discharge order & summary)  RN to RN report: [phone]    Bre Miguel RN, BSN  6A RN Care Coordinator  Ph: [phone]   Pager: [phone]  "

## 2022-10-20 NOTE — PLAN OF CARE
Status: Admitted 10/9 from OSH with respiratory distress, required intubation x1 day.     Neuros: A/ox4. Strength 5/5 throughout. Numbness to feet at baseline. Blurry vision, unchanged, glasses and teeth unavailable.  IV: SL.  Labs/Electrolytes: WNL  Resp/trach: LS clear, dim, on 2L NC during sleep.  Diet: Regular- poor po for dinner  Bowel status: LBM 10/17  : Voids spontaneously to urinal at bedside.  Skin: BLE venous stasis ulcers. Extremely mercedez to BLE. Daily dressing changes, done on day shift. Generalized bruising.   Pain: Denies  Activity: 1A FWW GB. Sat in chair for approx 2 hours this miguel.  Plan: Discharge to [facility] pending PA.  Updates this shift: None

## 2022-10-20 NOTE — PROGRESS NOTES
"[id number] progress note. Care from: 07:00 - 15:00     BP (!) 147/53 (BP Location: Right arm)   Pulse 65   Temp 97.1  F (36.2  C) (Oral)   Resp 18   Ht 1.803 m (5' 10.98\")   Wt 116.9 kg (257 lb 11.5 oz)   SpO2 92%   BMI 35.96 kg/m         Vitals: VSS     Neuro: WDL   o Pain: Denies   o Mood/Behavior: Calm, cooperative    Activity: Resting in bed between being up to chair for meals, ambulating in hallway with staff     Cardiovascular: WDL ex BLE venous stasis/edema     Respiratory: WDL ex mild cough     GI & Nutrition: Good appetite and tolerating diet.   o Nausea: Denies   o Bowel Movement: Last BM today.     : WDL     Skin, Wounds & LDAs: BLE stasis ulcers. Skin otherwise intact ex bruising. Wound care completed per plan of care. PIV intact, saline locked     Events & Plan Updates: No acute changes, few requests. Patient is medically stable and awaiting placement to rehab facility.   "

## 2022-10-20 NOTE — PROGRESS NOTES
Care Management Follow Up     Length of Stay (days): 11     Expected Discharge Date:  10/20/2022     Concerns to be Addressed: Disposition planning  Patient plan of care discussed at interdisciplinary rounds: Yes     Anticipated Discharge Disposition: Short term TCU placement     Anticipated Discharge Services:    Short term TCU placement  Anticipated Discharge DME:  Not applicable     Patient/family educated on Medicare website which has current facility and service quality ratings:  yes  Education Provided on the Discharge Plan:  yes  Patient/Family in Agreement with the Plan: yes     Referrals Placed by CM/SW: Post acute care facility's  Private pay costs discussed:  Not applicable at this time     Additional Information:  SW is following for discharge planning.   Prior to hospitalization, pt was living in a assisted living apt at [facility] and receiving skilled home care services through [facility].  TCU placement is recommended.  Per ENT, pt remains medically ready for discharge.  Pt has been assessed and approved for admit to [facility] pending receipt of prior auth from insurance.  On 10/20/2022, NORM received a 10/19/2022 5:20pm voice mail from  Admissions (Shannan Oakley) for [facility] indicating that pt's insurance denied coverage for a TCU stay indicating that pt does not have skilled rehab needs.  Per review of most recent available (10/18/2022) Physical Therapy notes, pt is now SBA for all mobility using a ww.      NORM phoned Shannan and left a message for Shannan to call in regards to the appeal process.  NORM received a return call from Radha with Admissions for [facility].  Radha indicates that the above denial can be appealed by contacting pt's insurance at [phone], option 4, to complete a peer to peer.  NORM paged ENT  (Eloina Parada) and updated.  Eloina states that she would support pt returning to the Assisted Living if the Assisted Living indicates  that they can meet pt's needs.  Eloina indicates that she will phone peer to peer if AL is not able to meet current level of need.      NORM spoke with OT who indicates that pt may be near to baseline and as this is the case, they were going to assess pt ability to shower today and were going to potentially recommend discharge to home.       NORM phoned pt's sister (Deborah) and updated.  Deborah voices satisfaction with plan for pt to discharge directly back to assisted living it assisted living indicates that they can meet current level of need.    NORM updated the 6A RN CC who will follow up with pt's assisted living to determine whether or not they can accept pt for return.      BEV Hdez  Social Work, 6A  Phone:  [phone]  Pager:  [phone]  10/20/2022

## 2022-10-21 NOTE — PROGRESS NOTES
CLINICAL NUTRITION SERVICES - BRIEF NOTE    Per chart review and interdisciplinary rounds this am, informed that patient is medically ready for discharge. No further nutrition intervention planned at this time.  RD to sign off at this time. RD can be consulted if needed.       Ivone Turner RD,LD  6A pager [phone]

## 2022-10-21 NOTE — PLAN OF CARE
Goal Outcome Evaluation:     Plan of Care Reviewed With: patient   Overall Patient Progress: no change    Time: 1900-0730  Neuros: A&O x4, 5/5 strengths throughout, denies numbness/tinlging.  Cardiac: WDL  Respiratory: WDL, stable on room air. Congested cough present.   GI/: Voids spontaneously into urinal at bedside. Last bowel movement 10/21.  Diet/Nausea: Tolerating level 6 diet with thin liquids, denies nausea.  Skin: Bilateral lower extremity leg ulcers, dressing change done during day shift, dressings CDI. Bilateral lower extremity edema present. Cracked/peeling skin on bilateral heels/feet. Bruising present on buttock.   LDA: Right PIV saline locked.   Labs: Reviewed  Pain: Denies  Activity: Assist x1 with gait belt and walker.   New changes this shift: Pt refused to eat dinner because they wouldn't give him pancakes. Slept well overnight.  Plan: Awaiting TCU placement or possible return to assisted living facility.

## 2022-10-21 NOTE — PLAN OF CARE
Status: Pt admitted from OSH for respiratory distress  Vitals: VSS on RA  Neuros: Intact  IV: PIV is SL  Labs/Electrolytes: WDL  Resp/trach: Lung sounds are clear  Diet: Soft and bite sized w/ thins  Bowel status: Bowel sounds are active, BM today  : Voiding spotaneously  Skin: Venous stasis ulcers on bilateral ankle wounds w/ CDI dressings, dressings were changed on day shift, BLE are mercedez  Pain: Denied  Activity: Assist x1 w/ GB and walker  Social: Pt was crabby throughout the shift  Plan: Will continue to monitor and follow POC, awaiting either rehab placement or return to senior care  Updates this shift: Pt was very frustrated about not being able to order pancakes on current level of diet.

## 2022-10-22 NOTE — PROGRESS NOTES
"Otolaryngology Progress Note    S: ALMAS. Says his day went well yesterday. Was able to walk around the unit.     O: /72 (BP Location: Right arm)   Pulse 75   Temp 96.8  F (36  C) (Oral)   Resp 16   Ht 1.803 m (5' 10.98\")   Wt 116.9 kg (257 lb 11.5 oz)   SpO2 95%   BMI 35.96 kg/m    General: Resting comfortably  HEENT: Voice at baseline. No trouble swallowing.   Pulm: Breathing comfortably on RA, no stridor or stertor. No audible secretions.    A/P: [place] C Malik is a 82 year old male with a past medical history of PE, DVT, PVD, venous stasis ulcers, HTN, and HLD presenting as a transfer from OSH after he was intubated in the setting of respiratory distress following a four day course of cough and sore throat. There was initial concern regarding airway compression from a thyroid nodule seen on CT scan but this was felt to be noncontributory to acute presentation. Medically stable and appropriate for discharge.    Neuro: prn Tylenol  HEENT: s/p Decadron. BID flovent   CV: HDS; PTA lipitor, apixaban, Lasix  Pulm: supplemental O2 as indicated   Heme: LOYDA, PTA apixiban  ID: s/p unasyn and augmentin  Endo: BG monitoring, getting medium dose sliding scale insulin  GI: Soft and bite sized diet, cleared for thin liquids per SLP. On PPI and probiotic. Okay for pancakes  MSK: lymphedema WOC following for venous stasis change to LE   Consults:  WOC, SLP, Nutrition, SW, PT, OT, Lymphedema  Ppx: SCDs, IS     Dispo: Remains medically ready for discharge. Waiting for placement.     -- Patient and above plan to be discussed with MD Dorothea Britton MD    "

## 2022-10-22 NOTE — PLAN OF CARE
Status: Pt admitted from OSH for respiratory distress  Vitals: VSS on RA. Continuous pulse ox   Neuros: Intact  IV: PIV SL  Labs/Electrolytes: WDL  Resp/trach: LS clear but diminished- a little SOB with activity  Diet: Soft and bite sized w/ thins. Pt has order that it is okay for him to have pancakes, but dietary is unable to see this comment and therefore he was unable to have pancakes today because they are not included in the level 6 diet - patient very upset about this.   Bowel status: LBM 10/22 - loose and mucusy  : Voiding spotaneously via urinal  Skin: Venous stasis ulcers on bilateral ankle wounds - dressing were changed yesterday on day shift are still clean, dry and intact.   Pain: Denies pain   Activity: Up SBA   Social: Pt pleasent overnight  Plan: Discharge to previous assisted living possibly on Monday.

## 2022-10-22 NOTE — PLAN OF CARE
Status: Pt admitted from OSH for respiratory distress  Vitals: VSS on RA. Continuous pulse ox   Neuros: Intact  IV: PIV SL  Labs/Electrolytes: WDL  Resp/trach: Denies SOB  Diet: Soft and bite sized w/ thins. Pt has order that it is okay for him to have pancakes, but dietary is unable to see this comment and therefore he was unable to have pancakes today because they are not included in the level 6 diet - patient very upset about this.   Bowel status: LBM 10/21  : Voiding spotaneously  Skin: Venous stasis ulcers on bilateral ankle wounds w/ CDI dressings, wounds were cleansed and dressings were changed per orders this shift.   Pain: Denied pain   Activity: Up SBA/GB and walker   Social: Pt upset but cooperative this shift.   Plan: Will continue to monitor and follow POC. Plan to return to intermediate when able to transfer independently

## 2022-10-22 NOTE — PLAN OF CARE
Goal Outcome Evaluation: Progressing     Status: Pt admitted from OSH for respiratory distress  Vitals: VSS on RA.   Neuros: A/O x4,   IV: PIV SL  Labs/Electrolytes: WDL  Resp/trach: Denies SOB  Diet: Soft and bite sized w/ thins.  Bowel status: LBM 10/22  : Voiding spotaneously  Skin: Venous stasis ulcers on BLE wounds. Dressing changed per order during the shift.  Pain: Denied pain   Activity: Up SBA/GB and walker   Plan: Will continue to monitor and follow POC.

## 2022-10-22 NOTE — PROGRESS NOTES
"Otolaryngology Progress Note    S: ALMAS. Would like pancakes     O: /72 (BP Location: Right arm)   Pulse 75   Temp 96.8  F (36  C) (Oral)   Resp 16   Ht 1.803 m (5' 10.98\")   Wt 116.9 kg (257 lb 11.5 oz)   SpO2 95%   BMI 35.96 kg/m    General: Resting comfortably  HEENT: Voice at baseline. No trouble swallowing.   Pulm: Breathing comfortably on RA, no stridor or stertor. No audible secretions.    A/P: [place] C Malik is a 82 year old male with a past medical history of PE, DVT, PVD, venous stasis ulcers, HTN, and HLD presenting as a transfer from OSH after he was intubated in the setting of respiratory distress following a four day course of cough and sore throat. There was initial concern regarding airway compression from a thyroid nodule seen on CT scan but this was felt to be noncontributory to acute presentation. Medically stable and appropriate for discharge.    Neuro: prn Tylenol  HEENT: s/p Decadron. BID flovent   CV: HDS; PTA lipitor, apixaban, Lasix  Pulm: supplemental O2 as indicated   Heme: LOYDA, PTA apixiban  ID: s/p unasyn and augmentin  Endo: BG monitoring, getting medium dose sliding scale insulin  GI: Soft and bite sized diet, cleared for thin liquids per SLP. On PPI and probiotic. Okay for pancakes  MSK: lymphedema WOC following for venous stasis change to LE   Consults:  WOC, SLP, Nutrition, SW, PT, OT, Lymphedema  Ppx: SCDs, IS     Dispo: Remains medically ready for discharge. Waiting for placement.     -- Patient and above plan discussed with MD Dorothea Britton MD    "

## 2022-10-22 NOTE — PLAN OF CARE
[id number]  Pt transferred from OSH for respiratory distress on 10/17, vs ex intermittently HTN, neuros include: a/o x4, forgetful, generalized weakness. PIV SL, level 6 with good PO intake, voids via urinal, LBM 10/22. Pt up AO1 w/GB and walker, has dyspena on exertion, and RN noticing wheezing and diminished lung sounds. Pt denies pain, calls appropriately, watching the baseball game and napping between cares. Continuous O2 monitoring maintained ex when pt eating to allow for less restrictive movement. Continue to care per orders.

## 2022-10-23 NOTE — ANESTHESIA PROCEDURE NOTES
Airway       Patient location during procedure: ICU       Procedure Start/Stop Times: 10/23/2022 3:09 PM  Staff -        Anesthesiologist:  Guerrero Franco MD       Resident/Fellow: Martin Echevarria MD       Performed By: resident  Consent for Airway        Urgency: emergent       Consent: The procedure was performed in an emergent situation.  Report Obtained from Primary Care Team       History regarding most recent potassium obtained: Yes       History regarding presence/absence of renal failure obtained:Yes       History regarding stroke/CVA obtained:Yes       History regarding presence/absence of NM disorder: YesIndications and Patient Condition       Indications for airway management: respiratory insufficiency       Mallampati: I     Induction type:intravenous       Mask difficulty assessment: 1 - vent by mask    Final Airway Details       Final airway type: endotracheal airway       Successful airway: ETT - single  Endotracheal Airway Details        ETT size (mm): 7.5       Cuffed: yes       Successful intubation technique: video laryngoscopy       VL Blade Size: MAC 4       Grade View of Cords: 1       Adjucts: stylet       Position: Right       Measured from: lips       Secured at (cm): 24       Bite block used: None    Post intubation assessment        ETT secured, Vent settings by primary/ICU team, Primary/ICU team to review CXR, Sedation to be ordered by primary/ICU team and No apparent complications       Placement verified by: capnometry, equal breath sounds and chest rise        Number of attempts at approach: 1       Number of other approaches attempted: 0       Secured with: commercial tube gtz       Ease of procedure: easy       Dentition: Unchanged    Medication(s) Administered   etomidate (AMIDATE) injection - Intravenous   12 mg - 10/23/2022 3:09:00 PM  rocuronium (ZEMURON) 10 mg/mL injection - Intravenous   100 mg - 10/23/2022 3:09:00 PM  Medication Administration Time: 10/23/2022 3:09 PM

## 2022-10-23 NOTE — H&P
"SURGICAL ICU ADMISSION NOTE  10/23/2022    PRIMARY TEAM: ENT  PRIMARY PHYSICIAN: Dr. Gonzalez  REASON FOR CRITICAL CARE ADMISSION: airway management  ADMITTING PHYSICIAN: Dr. Berman  Date of Service: 10/24/2022    ASSESSMENT:  Zachary Gan is a 82 year old male with hx of DVT, PE on apixiban, PVD, HTN who was transferred from  to the SICU for airway compromise requiring intubation 2/2 midline vocal cords with minimal abduction on 10/9/2022. He was previously admitted to the SICU on 10/9 intubated after initially concern for thyroid nodule compression, however was deemed to have a suprglottic pathology at that time.    PLAN:    Neuro/ pain/ sedation:  - Pain: fentanyl gtt (weaning), prn tylenol, prn oxy  - Sedation: propofol gtt  - Monitor neurological status. Notify the MD/DO for any acute changes in exam.    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 There vocal cords were at the midline and there was almost no abduction of the cords on inhalation\"  - Start decadron 8mg q8h     Pulmonary care:   # Intubation for airway protection 10/23  # Small L pleural effusion  # Hx of 11 mm soft tisue nodule in superficial left frontal lobe  # Hx PE on apixiban (11/6/2017)  - Previously treated for concern for epiglottis   - CMV vent setting  Vent Mode: CMV/AC  (Continuous Mandatory Ventilation/ Assist Control)  FiO2 (%): 50 %  Resp Rate (Set): 14 " breaths/min  Tidal Volume (Set, mL): 500 mL  PEEP (cm H2O): 5 cmH2O  Resp: 13  - ETT in R mainstem on CT, pulled back 3 cm. Repeat CXR with 2cm above mark. Retracted additonal 1cm.   - Daily CXR   - Supplemental oxygen to keep saturation above 92 %.  - Incentive spirometer every 15- 30 minutes when awake.    Cardiovascular:    # PVD  # HTN  - Monitor hemodynamic status.   - required some norepinephrine post intubation as propofol doses were adjusted. Off now  - Lipitor    GI care/Nutrition:   - NPO except ice chips and medications.  - OG to LIS     Fluids/ Electrolytes:  - LR @ 75ml/hr  - Hold lasix  - ICU electrolyte replacement protocol    Renal/ Fluid Balance:   - Nur in place  - Will continue to monitor intake and output.    Endocrine:    # DM2 controlled with diet  # Steroids  - Sliding scale insulin given steroids    ID/ Antibiotics:  - No indication for antibiotics.     Heme:     # Hx DVT of RUE unspecified time  # Chronic disease anemia   - Apixaban (okay to continue per ENT at this time)    MSK:  # Venous stasis dermatitis of B/LE  # Thoracic spondylosis without myelopathy  # age indeterminate compression fx t4  - WOC consult  - PT/OT consulted    General Cares/Prophylaxis:    DVT Prophylaxis: sqh   GI Prophylaxis: PO PPI  Restraints: Restraints for medical healing needed: NO    Lines/ tubes/ drains:  - PIV  - Midline IV  - Nur  - OG  - ETT    Disposition:  - Surgical ICU     Patient seen, findings and plan discussed with surgical ICU staff Dr. Cullen Roblero MD  PGY-2 Surgery    See [facility] for on-call pager information.      - - - - - - - - - - - - - - - - - - - - - - - - - - - - - - - - - - - - - - - - - - - - - - - - - - - - - - - - - - - - - - - - - - - - - - - -     HISTORY PRESENTING ILLNESS:     Zachary Gan is a 82 year old male with hx of DVT, PE on apixiban, PVD, HTN who was transferred from  to the SICU for airway compromise requiring intubation 2/2 midline vocal cords  with minimal abduction on 10/9/2022. ENT was called to bedside for change in respiratory status including stridor but maintaining O2 satts on room air. Laryngoscopy concerning.     REVIEW OF SYSTEMS: 10 point ROS neg other than the symptoms noted above in the HPI.    PAST MEDICAL HISTORY:    has no past medical history on file.    SURGICAL HISTORY:    has a past surgical history that includes Picc And Midline Team Line Insertion (Right, 10/23/2022).    SOCIAL HISTORY:        FAMILY HISTORY: No bleeding/clotting disorders nor problems with anesthesia.    ALLERGIES:    Not on File    MEDICATIONS:  No current facility-administered medications on file prior to encounter.  apixaban ANTICOAGULANT (ELIQUIS) 5 MG tablet, Take 1 tablet (5 mg) by mouth 2 times daily  atorvastatin (LIPITOR) 40 MG tablet, Take 1 tablet (40 mg) by mouth At Bedtime  furosemide (LASIX) 20 MG tablet, Take 1 tablet (20 mg) by mouth every morning        PHYSICAL EXAMINATION:  Temp:  [96  F (35.6  C)-98.1  F (36.7  C)] 97.7  F (36.5  C)  Pulse:  [] 57  Resp:  [7-33] 13  BP: ()/() 106/57  FiO2 (%):  [40 %-50 %] 50 %  SpO2:  [93 %-100 %] 100 %  General: Alert, laying in bed  Neuro: A&Ox3  HEENT: Normocephalic, atraumatic.  Neck: supple neck, trachea midline  Respiratory: Non-labored breathing on room air; no use of accessory muscles of respiration. bipahsic stridor.   Cardiovascular: Regular rate and rhythm.   Gastrointestinal: Abdomen soft, non-distended, non-tender to palpation.   Genitourinary: deffered  MSK/Extremities: Moving all four extremities. No limb deformities. Venous stasis wounds on bilateral LE  Incisions/Skin: As noted above. No other rashes or lesions appreciated.      LABS: Reviewed.   Arterial Blood Gases   Recent Labs   Lab 10/23/22  2116   PH 7.50*   PCO2 41   PO2 77*   HCO3 32*     Complete Blood Count   Recent Labs   Lab 10/23/22  1534 10/19/22  0805 10/17/22  0644   WBC 10.3 10.5 11.1*   HGB 11.9* 11.5* 11.6*     295 282     Basic Metabolic Panel  Recent Labs   Lab 10/24/22  0231 10/23/22  1946 10/23/22  1809 10/23/22  1534   NA  --   --   --  138   POTASSIUM  --   --   --  3.6   CHLORIDE  --   --   --  100   CO2  --   --   --  30*   BUN  --   --   --  13.2   CR  --   --   --  1.22*   * 89 90 74     Liver Function Tests  Recent Labs   Lab 10/23/22  1534   AST 17   ALT 9*   ALKPHOS 101   BILITOTAL 0.5   ALBUMIN 3.3*   INR 1.45*     Pancreatic Enzymes  No lab results found in last 7 days.  Coagulation Profile  Recent Labs   Lab 10/23/22  1534   INR 1.45*   PTT 35     Lactate  Invalid input(s): LACTATE    IMAGING:  Recent Results (from the past 24 hour(s))   XR Chest Port 1 View    Narrative    Exam: XR CHEST PORT 1 VIEW, 10/23/2022 4:04 PM    Indication: s/p intubation    Comparison: 10/9/2022    Findings:   Endotracheal tube tip at the mid to low thoracic trachea. Enteric tube  sidehole projects over the proximal stomach. Stable cardiomediastinal  silhouette. The pulmonary vasculature is prominent and indistinct.  Stable left greater than right pleural effusions and perihilar and  basilar predominant mixed interstitial and streaky airspace opacities.  No pneumothorax.      Impression    Impression:   1. Endotracheal tube tip at the mid to low thoracic trachea, 2.6 cm  above the mark.  2. Continued left greater than right pleural effusions and perihilar  and basilar predominant mixed interstitial and streaky airspace  opacities.    KIARA AGUIRRE DO         SYSTEM ID:  [id number]   XR Abdomen Port 1 View    Narrative    Exam: XR ABDOMEN PORT 1 VIEW, 10/23/2022 4:09 PM    Indication: s/p intubation; OG placement confirmation    Comparison: 10/9/2022    Findings:   Supine frontal view of the abdomen. Enteric tube sidehole projects  over the proximal stomach near the gastroesophageal junction.  Continued diffusely air distended small and large bowel. No  appreciable pneumatosis or portal venous gas. Multilevel  degenerative  change in the spine.      Impression    Impression: Gastric tube sidehole projects over the proximal stomach  near the gastroesophageal junction. Consider slight advancement.    KIARA JEANNETTE DO TIMOTHY         SYSTEM ID:  [id number]   CT Soft Tissue Neck w Contrast    Narrative    CT SOFT TISSUE NECK W CONTRAST 10/23/2022 6:46 PM    History:  s/p intubation; stridor;  ICD-10:      Comparison:  None     Technique: Following intravenous administration of nonionic iodinated  contrast medium, thin section helical CT images were obtained from the  skull base down to the level of the aortic arch.  Axial, coronal and  sagittal reformations were performed with 2-3 mm slice thickness  reconstruction. Images were reviewed in soft tissue, lung and bone  windows.    Contrast: Isovue 370    Findings:   Evaluation of the mucosal space demonstrates no evident abnormality in  the nasopharynx, oropharynx, hypopharynx or the glottis. Debris in the  oral airway. The tongue base appears normal. 7 mm lymph node within  the left parotid gland (series 2 image 31). Large multinodular goiter.    There is no evident cervical lymphadenopathy. The fascial spaces in  the neck are intact bilaterally. The major vascular structures in the  neck appear unremarkable. Prominent left external jugular vein.    Evaluation of the osseous structures demonstrate no worrisome lytic or  sclerotic lesion. No overt spinal canal or neuroforaminal stenosis.  Mucous retention cyst in the right maxillary sinus. The mastoid air  cells are clear.     Please see CT chest same date regarding the lungs. The tracheal tube  tip courses inferior to the field of view. Partially visualized  orogastric tube. Advanced degenerative changes of the cervical spine  without significant spinal canal narrowing. Severe neural foraminal  narrowing at T1-2      Impression    Impression:    1. Large multinodular goiter extending into the thoracic inlet and  exerting mass effect  on adjacent structures.  2. Prominent left parotid lymph node.  3. Advanced degenerative changes of the cervical spine without  significant spinal canal narrowing.    I have personally reviewed the examination and initial interpretation  and I agree with the findings.    KHADIJAH HASTINGS MD         SYSTEM ID:  [id number]   CT Chest w Contrast    Narrative    EXAMINATION: Chest CT  10/23/2022 6:53 PM    CLINICAL HISTORY: s/p intubation; stridor    COMPARISON: Same-day chest radiographs, CT neck. Outside CT report,  10/8/2022    TECHNIQUE: CT imaging obtained through the chest with contrast. Axial,  coronal, and sagittal reconstructions and axial MIP reformatted images  are reviewed.     CONTRAST: Isovue 370 100 mL    FINDINGS:  Endotracheal tube tip is in the proximal right main bronchus. Gastric  tube terminates in the body of the stomach.    Lungs:    Intubated central airways are patent. Small left pleural effusion with  associated linear and consolidative changes in the left lower lobe and  lingula, most likely atelectasis. Atelectasis/scarring along the right  major fissure. Subsegmental linear and slightly rounded atelectasis in  the right lower and right middle lobes. Multiple scattered calcified  granulomas.     Mediastinum: Please refer to CT neck report for details regarding the  multinodular thyroid gland. Cardiomegaly. No pericardial effusion.  Normal caliber ascending aorta. Dilated main pulmonary artery  measuring up to 2.6 cm. Normal appearance and configuration of the  great vessels off of the aortic arch. No suspicious mediastinal,  hilar, or axillary lymph nodes. Calcified subcarinal lymph nodes.    Bones and soft tissues: Age-indeterminate T4 compression fracture.  Chronic lateral left rib fracture deformities.    Upper Abdomen: Unremarkable appearance of the adrenal glands. Multiple  hypoattenuating lesions in the liver with the largest measuring up to  3.3 cm with higher Hounsfield units than simple  fluid, though this may  be related to extensive streak artifact related to imaging with the  patient's arms down. Cholelithiasis without evidence of acute  cholecystitis. Right renal calculi with the largest measuring up to 7  mm.      Impression    IMPRESSION:   1. Endotracheal tube tip is in the proximal right main bronchus.  2. Small left pleural effusion. Subsegmental atelectasis in the lower  lobes, lingula, and right middle lobe.  3. Cardiomegaly. Dilated main pulmonary artery, which can be seen in  pulmonary hypertension.  4. Age-indeterminate T4 compression fracture. This has been documented  on outside CT report. However the images were not available for  comparison.  5. Multiple hypoattenuating lesions in the liver with largest  measuring up to 3.3 cm are not well characterized. Similar findings  have been described on outside imaging reports, though these images  are not available for review. Consider obtaining outside images for  comparison, otherwise consider nonurgent initial further evaluation  with ultrasound, though ultimately liver mass CT/MRI may be required  for further evaluation.  7. Cholelithiasis without evidence of acute cholecystitis. Right renal  calculi.    I have personally reviewed the examination and initial interpretation  and I agree with the findings.    KIARA AGUIRRE,          SYSTEM ID:  [id number]   XR Chest Port 1 View    Impression    RESIDENT PRELIMINARY INTERPRETATION  IMPRESSION: Similar appearance of left greater than right pleural  effusions. Stable perihilar and basilar predominant interstitial and  airspace opacities.

## 2022-10-23 NOTE — PROGRESS NOTES
Assisted with endotracheal intubation for airway protection. A #7.5 ETT was placed and secured at 24 cm @ lip. Positive color change noted with CO2 detector, breath sounds were equal bilaterally. Patient placed on the following vent settings, labs and CXR pending.    Vent Mode: CMV/AC  (Continuous Mandatory Ventilation/ Assist Control)  Resp Rate (Set): 14 breaths/min  Tidal Volume (Set, mL): 500 mL  PEEP (cm H2O): 5 cmH2O  Resp: 18    Jeevan Reynolds RT, RT  10/23/2022 3:28 PM

## 2022-10-23 NOTE — PROGRESS NOTES
Brief ENT Progress Note    I called patient's wife 2x (at 5pm and 6:45pm) to update on change in patient's status but I was unable to reach her. Also called the home phone number listed in the chart w/no answer. Patient's family has yet to be updated on intubation and transfer to ICU.    Trudi Willingham MD   Otolaryngology-Head & Neck Surgery PGY2  Please contact ENT on call with questions

## 2022-10-23 NOTE — PROGRESS NOTES
"Otolaryngology Progress Note    S: NAEON. Had some SOB last night, felt his throat was too dry to cough up secretions. Resolved with adding O2, IS, hydration, and inhaler. Denies difficulty breathing this morning.     O: BP (!) 161/64 (BP Location: Right arm)   Pulse 91   Temp (!) 96.7  F (35.9  C) (Oral)   Resp 18   Ht 1.803 m (5' 10.98\")   Wt 116.9 kg (257 lb 11.5 oz)   SpO2 93%   BMI 35.96 kg/m    General: Resting comfortably  HEENT: Voice at baseline. No trouble swallowing.   Pulm: Breathing comfortably on RA, no stridor or stertor. No audible secretions.    A/P: [place] C Malik is a 82 year old male with a past medical history of PE, DVT, PVD, venous stasis ulcers, HTN, and HLD presenting as a transfer from OSH after he was intubated in the setting of respiratory distress following a four day course of cough and sore throat. There was initial concern regarding airway compression from a thyroid nodule seen on CT scan but this was felt to be noncontributory to acute presentation. Medically stable and appropriate for discharge.    Neuro: prn Tylenol  HEENT: s/p Decadron.    CV: HDS; PTA lipitor, apixaban, Lasix  Pulm: supplemental O2 as indicated, BID flovent, incentive spirometry, OOB at least 3x per day   Heme: LOYDA, PTA apixiban  ID: s/p unasyn and augmentin  Endo: BG monitoring  GI: Soft and bite sized diet, cleared for thin liquids per SLP. On PPI and probiotic. Okay for pancakes  MSK: lymphedema WOC following for venous stasis change to LE   Consults:  WOC, SLP, Nutrition, SW, PT, OT, Lymphedema  Ppx: SCDs, IS     Dispo: Remains medically ready for discharge. Waiting for placement.     -- Patient and above plan to be discussed with MD Dorothea Britton MD    "

## 2022-10-23 NOTE — PROGRESS NOTES
Status: Pt admitted from OSH for respiratory distress  Vitals: VSS on RA.   Neuros: A/O x4,   IV: PIV SL  Labs/Electrolytes: WDL  Resp/trach: Wheezing and complain of sore throat. Later reported SOB.   Diet: Soft and bite sized w/ thins ate only breakfast.   Bowel status: LBM 10/23   : Voiding spotaneously  Skin: Venous stasis ulcers on BLE wounds. Dressing changed per order during the shift.  Pain: PRN Tylenol given for sore throat.    Activity: Up SBA/GB and walker   Change during the shift: Pt reported not feeling well his throat pain, not able to cough up. ENT team informed and charge nurse updated. Vitals has been stable on RA, continuous sat on. PRN rescue inhaler admin but not helping. Team paged again by the charge nurse. Stridor worsening; ENT team at the bedside and ICU team arrived and transfer to  for intubation. Pt is intubated and report given to ICU RN.       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 Sanchez on 3/15 10:00 am    # Fluctuating Pulse rate >> Would check 12 lead EKG >>NSR     # Advance COPD  - no wheezing  - pt completed prednisone taper   - pt states she quit smoking when she became sick  - duonebs prn    # Anxiety/ depression  - c/w sertraline  - xanax prn     # GI/ DVT ppx not indicated/ heparin  Code: Full code   Dispo: home once denver catheter placed    #Progress Note Handoff  Pending (specify):  DENVER cath possible on Monday.    Disposition: Home

## 2022-10-23 NOTE — PLAN OF CARE
Status: Pt admitted from OSH for respiratory distress.   Vitals: AVSS on 2L face mask when sleeping. Continuous pulse ox   Neuros: Intact  IV: PIV SL  Labs/Electrolytes: WDL  Resp/trach: LS clear but diminished- a little SOB with activity and wheezy. Had one episode at 0130 where pt c/o of difficulty breathing - SPO2 was in the mid 90's throughout. Pt did sound wheezy, stated that his throat was so dry that he could not cough up secretions- placing pt on a bubbler with oxygen, having pt do IS, drinking water and giving them hard candy; along with convincing him to do his inhaler effective was helpful- no longer having difficulty breathing but still having expiratory wheezes.  udpated via web paging and asked if there are any PRNs she could order that would help  Diet: Soft and bite sized w/ thins. Pt has order that it is okay for him to have pancakes, but dietary is unable to see this comment and therefore he was unable to have pancakes today because they are not included in the level 6 diet - patient very upset about this- even yelled about it in the middle of the night.  Bowel status: LBM 10/23  : Voiding spotaneously via urinal  Skin: Venous stasis ulcers on bilateral ankle wounds - dressing were changed yesterday on day shift are still clean, dry and intact.   Pain: Denies pain except sore dry throat. Refused Tylenol  Activity: Up SBA   Social: Pt was agitated overnight when attempting to do interventions to help with breathing but would reluctantly agree to do it.  Plan: Discharge to previous assisted living possibly on Monday.

## 2022-10-23 NOTE — PROVIDER NOTIFICATION
Notified Trudi Willingham with ENT regarding 6214-1 CA: pt complains of exp. wheezes and something stuck in throat, o2 sats fine on RA. Can we get PRN rescue inhaler? Dyspnea on exertion noted as well. Dry, cant get secretions up with cough. Birdie Thayer [id number]    11:39 AM: Rescue inhaler ordered. Asked staff to come to bedside to assess patient as wheezing seems to be worsening.     1200: Trudi Willingham  with ENT informed this RN that pt will need to be scoped but needs to attend to another emergent case at this time. Bedside nurse Meserat updated.     1400: Scoped at bedside.    1440: Floor nurse went in to assess pt, stridor worsening, rapid response needing to be called for stat intubation and transfer to . 4a transfer initiated to go to 4213 after charge nurse was notified on 4a of one bed. SICU resident and Trudi ARRIOLA MD with ENT at bedside and declined to call rapid response, instructed to take pt to available bed and intubation with take place once arrived on 4a. Resource nurses called, no one available to help transfer pt. 4a called and informed staff ICU nurse on way to help transfer. As pt was transferred onto elevator and sent downstairs, ICU nurse arrived and was instructed by this writer to go back to 4a as pt was already on the elevator. ANS updated following transfer and de-brief completed with floor nurses.

## 2022-10-23 NOTE — PROGRESS NOTES
Reason: Pt complain of sore throat   Provider notified:Yes  Time: 08:50   Mode of contact: in person

## 2022-10-23 NOTE — PROGRESS NOTES
Brief ENT Progress Note  10/23/22    Subj/Intvl: I was called to evaluate the patient for change in respiratory status. Initially ordered albuterol and racemic epinephrine nebulizer as I was unable to scope the patient immediately d/t need for emergent airway evaluation at a different hospital. On my evaluation patient was noted to have biphasic stridor but maintaining sats on room air. He complained of feeling short of breath and felt that he had something stuck in his throat. With flexible scope demonstrating midline cords with almost no abduction on inhalation, the decision was made to transfer the patient to the SICU and intubate. Patient was immediately discussed with the SICU resident and fellow as well as the anesthesia airway team and patient was then transferred successfully to SICU room. Supplies were gathered for potential surgical airway and patient was sedated and paralyzed, and successfully intubated with a 7.5 ETT without issue. CMAC view with mild supraglottic edema and midline cords, ETT passed successfully with some additional force, per anesthesia resident.    Objective:  Gen - sitting up in bed.  HEENT - trachea midline, no tracheal tugging. No accessory respiratory muscle use.  Resp - biphasic stridor with prolonged expiratory phase.     FIBEROPTIC ENDOSCOPY:  Due to stridor, fiberoptic laryngoscopy was indicated. After obtaining verbal consent, the fiberoptic laryngoscope was passed under endoscopic vision through the left nasal passage. The epiglottis was sharp, there was mild edema of the arytenoids and AE folds. There vocal cords were at the midline and there was almost no abduction of the cords on inhalation.    A&P: Zachary Gan is a 82 year old male with a past medical history of PE, DVT, PVD, venous stasis ulcers, HTN, and HLD presenting as a transfer from [facility] on 10/09/2022 after he was intubated in the setting of respiratory distress following a four day course of cough and sore  throat. There was initial concern regarding airway compression from a thyroid nodule seen on CT scan but upon extubation and airway evaluation, this was felt to be noncontributory to acute presentation. He extubated successfully on 10/10 and was flex scoped and noted to have mobile cords bilaterally and mild arytenoid edema with some vocal fold irregularities which were thought to be 2/2 contact irritation from the ETT. 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 Now intubated in the SICU. Etiology for the patient's bilateral cord paresis at this time is unknown but plan for CT Neck/Chest w/contrast to re-evaluate the previously noted thyroid mass.    Neuro:   - sedation and pain control per SICU    HEENT: s/p intubation on 10/23 for biphasic stridor with limited vocal cord abduction on flex scope  - ct neck/chest w/contrast to eval for malignancy vs compressive goiter to explain bilateral cord paresis    CV: HDS  - PTA lipitor, apixaban, Lasix    Pulm:   - remain intubated for the time being     Heme: no acute issues  - PTA apixiban    ID: s/p unasyn and augmentin   - no indication for abx at this time    Endo: BG monitoring per SICU    FEN/GI: no acute issues  - enteric access and nutrition per SICU  - electrolyte replacements prn  - on PPI and probiotic    : mcpherson placed in SICU  - monitor renal function given elevated cr    MSK: lymphedema WOC following for venous stasis change to LE     Consults:  WOC, SLP, Nutrition, SW, PT, OT, Lymphedema    Ppx: SCDs, IS    Trudi Wlilingham MD   Otolaryngology-Head & Neck Surgery PGY2  Please contact ENT on call with questions

## 2022-10-23 NOTE — PROCEDURES
[facility]    Double Lumen Midline Placement    Date/Time: 10/23/2022 4:59 PM  Performed by: EZIO Jarrett  Authorized by: Melissa Castro CNP   Indications: vascular access      UNIVERSAL PROTOCOL   Site Marked: Yes  Prior Images Obtained and Reviewed:  Yes  Required items: Required blood products, implants, devices and special equipment available    Patient identity confirmed:  Verbally with patient, arm band, provided demographic data, hospital-assigned identification number and anonymous protocol, patient vented/unresponsive  Patient was reevaluated immediately before administering moderate or deep sedation or anesthesia  Confirmation Checklist:  Patient's identity using two indicators, relevant allergies, procedure was appropriate and matched the consent or emergent situation and correct equipment/implants were available  Time out: Immediately prior to the procedure a time out was called    Universal Protocol: the Joint Commission Universal Protocol was followed    Preparation: Patient was prepped and draped in usual sterile fashion       ANESTHESIA    Anesthesia: See MAR for details  Local Anesthetic:  Lidocaine 1% without epinephrine  Anesthetic Total (mL):  1      SEDATION    Patient Sedated: No        Preparation: skin prepped with ChloraPrep  Type of line used: Midline  Catheter type: double lumen  Catheter size: 5 Fr  Brand: Bard  Placement method: venipuncture, MST and ultrasound  Number of attempts: 1  Difficulty threading catheter: no  Successful placement: yes  Orientation: right    Location: basilic vein  Tip Location: distal to axillary vein  Arm circumference: adults 10 cm  Extremity circumference: 35  Visible catheter length: 0  Total catheter length: 20  Dressing and securement: chlorhexidine disc applied, dressing applied, line secured, statlock, sterile dressing applied and transparent dressing  Post procedure assessment: blood  return through all ports and free fluid flow  PROCEDURE   Patient Tolerance:  Patient tolerated the procedure well with no immediate complicationsDescribe Procedure: Mid line ok to use

## 2022-10-24 NOTE — PROGRESS NOTES
"[facility] Nurse Inpatient Assessment     Consulted for: bilateral LE wounds     Patient History (according to provider note(s):    82 year old male with hx of DVT, PE on apixiban, PVD, HTN who was transferred from  to the SICU for airway compromise requiring intubation 2/2 midline vocal cords with minimal abduction on 10/9/2022. He was previously admitted to the SICU on 10/9 intubated after initially concern for thyroid nodule compression, however was deemed to have a suprglottic pathology at that time.    Areas Assessed:      Areas visualized during today's visit: Focused: and Lower extremities   Bilaterally: PP 2/4, appropriately warm from toes to knees, minimal edema, dark purple/brown hemosiderin staining from ankles to knees.      Wound location: RLE      Date of photos: 10/10/22 and 10/24/22  Wound due to: Venous Ulcer  Wound history/plan of care: pt believes wounds due to ACE wraps.  Currently with adaptic, roll gauze.  Pt has been adamant about keeping any compression off  Wounds are venous stasis ulcers     Wound base: mixture of 25% dermis and 75% fibrin.  6 x 8 cm band of wounds on anterior gator area with increased epithelium between wounds.  Scattered dry fibrinous scabs on LE       Palpation of the wound bed: normal      Drainage: scant     Description of drainage: serosanguinous    Odor: mild  Pain: mild,     Wound location: RLE      Date of photos: 10/10/22 and 10/24/22  Wound due to: per pt, wound \"from rubbing on the bed\"   Wound is on posterior LE, immediately superior to achilles   Wound history/plan of care: currently with adaptic, kerlix  Wound base: 75 % new epidermis , 25% dark red fibrin     Palpation of the wound bed: normal      Drainage: none     Description of drainage: none and serous     Measurements (length x width x depth, in cm): 4  x 1.2  x  0cm     Periwound skin: Erythema- blanchable  Resolved      Odor: mild  Pain: mild, "   Pain interventions prior to dressing change: N/A  Treatment goal: Heal   STATUS: improving  Supplies ordered: gathered and supplies stored on unit     Treatment Plan:     Bilateral LE wounds: daily  Cleanse wounds and skin with wound cleanser and dry gauze.  Pat dry   Cover intact skin with sween 24 if pt allows, or vaseline if he c/o burning   Cover wounds with vaseline gauze.   Cover vaseline gauze with appropriate amt of dry gauze or ABD pads-enough to contain drainage for 24 hours.   Secure with kerlix roll gauze.        Orders: Reviewed and Written    RECOMMEND PRIMARY TEAM ORDER: Lymphedema consult  Education provided: plan of care, wound progress and need for compression   Discussed plan of care with: Patient and Nurse  WOC nurse follow-up plan: weekly  Notify WOC if wound(s) deteriorate.  Nursing to notify the Provider(s) and re-consult the WOC Nurse if new skin concern.    DATA:     Current support surface: Standard  Low air loss mattress  BMI: Body mass index is 34.18 kg/m .   Active diet order: Orders Placed This Encounter      Diet      NPO for Medical/Clinical Reasons Except for: Meds     Output: I/O last 3 completed shifts:  In: 1688.32 [I.V.:1618.32; NG/GT:70]  Out: 1865 [Urine:1865]     Labs:   Recent Labs   Lab 10/24/22  0524 10/23/22  1534   ALBUMIN  --  3.3*   HGB 11.2* 11.9*   INR  --  1.45*   WBC 8.3 10.3     Pressure injury risk assessment:   Sensory Perception: 3-->slightly limited  Moisture: 4-->rarely moist  Activity: 3-->walks occasionally  Mobility: 3-->slightly limited  Nutrition: 3-->adequate  Friction and Shear: 2-->potential problem  Costa Score: 18    [facility]  Phone: [phone]  Pager: [phone]

## 2022-10-24 NOTE — PLAN OF CARE
Major Shift Events:  6954-3970  Neuro: Aroused to voice/pain. Follows commands. Moves all extremities spontaneously. Pt is restrained, attempted to self extubated.   CV: Sinus marvin/sinus rhythm with PACs. Afebrile. HR 55-90s. Titrated on and off of levo MAX .03 mcg/kg/min overnight based on MAP. MAPs ranged from 51-90s.500 LR bolus.   Resp: CMV 50%, 14, 500, 5. ETT 24 cm. LS diminished. Copious amount of secretions clear/white.   GI: No BM. NPO.   : mcpherson patent and draining. Output >50 ml/hr   Skin: BLE wound, daily wound care. Reddened ears.   IV/Drips:  R DL Midline, no blood return   L PIV  Propofol 15 mcg/min  LR 75 ml/hr  Plan: Monitor respiratory status and hemodynamics    For vital signs and complete assessments, please see documentation flowsheets.

## 2022-10-24 NOTE — PROGRESS NOTES
"[facility]    ICU Progress Note       Date of Admission:  10/9/2022    Assessment: Critical Care   ASSESSMENT:  Zachary Gan is a 82 year old male with hx of DVT, PE on apixiban, PVD, HTN who was transferred from  to the SICU for airway compromise requiring intubation 2/2 midline vocal cords with minimal abduction on 10/9/2022. He was previously admitted to the SICU on 10/9 intubated after initially concern for thyroid nodule compression, however was deemed to have a suprglottic pathology at that time.     PLAN:     Neuro/ pain/ sedation:  - Pain: prn tylenol, prn oxy  - Sedation: wean propofol gtt  - Monitor neurological status. Notify the MD/DO for any acute changes in exam.     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 There vocal cords were at the midline and there was almost no abduction of the cords on inhalation\"  - decadron 8mg q8h     Pulmonary care:   # Intubation for airway protection 10/23  # Small L pleural effusion  # Hx of 11 mm soft tisue nodule in superficial left frontal lobe  # Hx PE on apixiban (11/6/2017)  - Previously treated for concern for epiglottis   - CMV vent setting  Vent Mode: CMV/AC  (Continuous Mandatory Ventilation/ Assist Control)  FiO2 (%): 50 %  Resp Rate (Set): 14 breaths/min  Tidal Volume (Set, mL): 500 mL  PEEP (cm H2O): 5 cmH2O  Resp: 14  - pending " mental status, will talk w/ ENT about PST  - ETT in R mainstem on CT, pulled back 3 cm. Repeat CXR with 2cm above mark. Retracted additonal 1cm.   - Daily CXR   - Supplemental oxygen to keep saturation above 92 %.  - Incentive spirometer every 15- 30 minutes when awake.     Cardiovascular:    # PVD, # HTN  - Monitor hemodynamic status. Continue PTA Lipitor.    - on minimal levophed.      GI care/Nutrition:   - NPO except ice chips and medications.  - OG to LIS      Fluids/ Electrolytes:  - LR @ 75ml/hr  - Hold lasix  - ICU electrolyte replacement protocol    Renal/ Fluid Balance:   - Nur in place  - Cr continues to uptrend  - Will continue to monitor intake and output.     Endocrine:    # DM2 controlled with diet  # Steroids  - Sliding scale insulin given steroids     ID/ Antibiotics:  - No indication for antibiotics.      Heme:     # Hx DVT of RUE unspecified time  # Chronic disease anemia   - Apixaban (okay to continue per ENT at this time)     MSK:  # Venous stasis dermatitis of B/LE  # Thoracic spondylosis without myelopathy  # age indeterminate compression fx t4  - WOC consulted, appreciate recs  - PT/OT consulted. Appreciate recs     General Cares/Prophylaxis:    DVT Prophylaxis: sqh   GI Prophylaxis: PO PPI     Lines/ tubes/ drains:  - PIV, Nur, OG, and ETT     Disposition:  - Wean off of ventilatory support    Code Status: Full Code      The patient's care was discussed with the SICU fellow and attending on service.    Félix Leija MD  [facility]  Securely message with the Vocera Web Console (learn more here)  Text page via [employer] Paging/Directory   ___________________________________________________________________    Interval History  Pt in soft restraints for previous attempt at self extubation. On minimal to no levophed this morning.     Physical Exam   Vital Signs: Temp: 97.1  F (36.2  C) Temp src: Axillary BP: 103/50 Pulse: 55   Resp: 13 SpO2:  99 % O2 Device: Mechanical Ventilator    Weight: 244 lbs 14.9 oz  GENERAL: nad, obese  CARDIO: normal rate and regular rhythm   PULM: mechanical breath sounds  ABD: non-distended and soft  EXT: hyperpigmented LE b/l, no pitting edema  NEURO: sedated, opens eyes to sternal rub    Data   I reviewed all medications, new labs and imaging results over the last 24 hours.  Arterial Blood Gases   Recent Labs   Lab 10/23/22  2116   PH 7.50*   PCO2 41   PO2 77*   HCO3 32*     Complete Blood Count   Recent Labs   Lab 10/24/22  0524 10/23/22  1534 10/19/22  0805   WBC 8.3 10.3 10.5   HGB 11.2* 11.9* 11.5*    280 295     Basic Metabolic Panel  Recent Labs   Lab 10/24/22  0820 10/24/22  0524 10/24/22  0413 10/24/22  0231 10/23/22  1809 10/23/22  1534   NA  --  136  --   --   --  138   POTASSIUM  --  4.4  --   --   --  3.6   CHLORIDE  --  100  --   --   --  100   CO2  --  26  --   --   --  30*   BUN  --  15.9  --   --   --  13.2   CR  --  1.28*  --   --   --  1.22*   * 156* 164* 147*   < > 74    < > = values in this interval not displayed.     Liver Function Tests  Recent Labs   Lab 10/23/22  1534   AST 17   ALT 9*   ALKPHOS 101   BILITOTAL 0.5   ALBUMIN 3.3*   INR 1.45*     Pancreatic Enzymes  No lab results found in last 7 days.    Coagulation Profile  Recent Labs   Lab 10/23/22  1534   INR 1.45*   PTT 35

## 2022-10-24 NOTE — PROGRESS NOTES
"Otolaryngology Progress Note    S: Had acute respiratory distress over the weekend with flexible scope showing bilateral vocal fold hypomobility. Intubated 10/23, mechanically ventilated.     O: BP 90/57   Pulse 58   Temp 97.7  F (36.5  C) (Axillary)   Resp 14   Ht 1.803 m (5' 10.98\")   Wt 116.9 kg (257 lb 11.5 oz)   SpO2 96%   BMI 35.96 kg/m    General: Resting comfortably, intubated and sedated.  HEENT: Neck soft without palpable masses.   Pulm: Intubated with 7.5 ETT, mechanically ventilated CMV/AC, 14 RR, 500 Vt, 50% FiO2, PEEP 5. Cuff leak present.    A/P: Zachary Gan is a 82 year old male with a past medical history of PE, DVT, PVD, venous stasis ulcers, HTN, and HLD presenting as a transfer from OSH after he was intubated in the setting of respiratory distress following a four day course of cough and sore throat. There was initial concern regarding airway compression from a thyroid nodule seen on CT scan but this was felt to be noncontributory to acute presentation. He was medically stable and appropriate for discharge for 10 days but was not discharged due to challenges with TCU/nursing home acceptance. Over the weekend he developed acute respiratory distress with flexible scope showing bilaterally hypomobile TVFs. He was intubated at that time and remains intubated and sedated.    Neuro: - Propofol drip, fentanyl PRN  - Sedation and pain control per SICU.  HEENT: - Decadron restarted  - Will review previous scope exams and re-scope when extubated  CV: - HDS; PTA lipitor, apixaban, Lasix  Pulm: - Intubated, wean vent as tolerated.  - Extubation trial today  Heme: - LOYDA, PTA apixiban  ID: - S/p Unasyn and Augmentin, no need for further abx.  Endo: - BG monitoring per SICU  GI: - NG in place, nutrition per SICU  - Electrolyte replacement PRN  - On PPI  MSK: - Lymphedema WOC following for venous stasis change to LE   Consults:  - WOC, SLP, Nutrition, SW, PT, OT, Lymphedema  Ppx: - SCDs, IS    "   Dispo: Pending extubation and clinical improvement.    -- Patient and above plan to be discussed with MD Hiwot Britton MD  Otolaryngology-Head & Neck Surgery PGY-1  Please contact ENT with questions by dialing [phone] and entering job code [phone] when prompted.

## 2022-10-24 NOTE — PROGRESS NOTES
Admitted/transferred from:   Reason for admission/transfer: Emergent intubation   2 RN skin assessment: completed by DO and CB  Result of skin assessment and interventions/actions: BLE wound, redness blanchable sacrum, peeling feet, bruise abdomen  Height, weight, drug calc weight: Done  Patient belongings (see Flowsheet)  MDRO education added to care planN/A  ?

## 2022-10-24 NOTE — PLAN OF CARE
Major Shift Events:  Follows commands. Rass -3-2 on propofol. Anxious/agitated at times. SB 50s-70s. Map goal >65, no issues. Failed PS x2, 1st time pt was apneic and 2nd time pt was too agitated to follow any commands/take deep breaths. OG to LIS. No BM but passing gas. Nur in place. BLE wound dressing change done.   Plan: PS tomorrow and possibly extubate.   For vital signs and complete assessments, please see documentation flowsheets.

## 2022-10-25 NOTE — PROGRESS NOTES
Otolaryngology Progress Note  10/25/22    Patient seen at bedside after extubation. He reports his breathing is fine. Denies any SOB. Vocal quality consistent with pre-extubation.     O: Alert, NAD  HEENT: face symmetric, neck soft and flat  Respiratory: breathing non-labored on 2L O2 face mask, no stridor, no audible wheezing    FIBEROPTIC ENDOSCOPY:  Due to concern for limited vocal cord mobility prior to intubation, fiberoptic laryngoscopy was indicated. After obtaining verbal consent, the fiberoptic laryngoscope was passed under endoscopic vision through the right nasal passage. The turbinates were normal. The inferior and middle meati were clear without purulence, masses, or polyps. The nasopharynx was clear. The eustachian tubes were clear. The soft palate appeared normal with good mobility. The epiglottis was sharp, and the visualized portion of the vallecula was clear. The larynx was clear with mobile cords bilaterally. There is notable abduction and adduction of the cords with phonation. There is a contact ulceration at the vocal process on the right cord, and symmetric area of swelling/fullness on the left vocal process. The arytenoids were clear with mild swelling that is unobstructive, and there was mild pooling of clear secretions in the piriforms.     A/P: Possible vocal cord dysfunction versus laryngospasm at time of intubation, now resolved. Vocal cords appear to be moving appropriately with no obstructive supraglottic swelling.     - Increase PPI to BID  - Decadron 8mg Q8H x 3 doses for post-extubation steroids  - repeat laryngoscopy with Dr. Santos tomorrow  - OK to transfer to [facility] later today if continues to do well from a respiratory standpoint    Eloina Bullock PA-C  Otolaryngology-Head & Neck Surgery  Please contact ENT by dialing * [phone] and entering job code [phone].

## 2022-10-25 NOTE — PROGRESS NOTES
[facility], Procedure Note          Extubation:       Zachary Gan  MRN# [id number]   [id number]         Patient extubated at: October 25, 2022, 10:50 AM   Supplemental Oxygen: Via Oxymask at 5 liters per minute   Cough: The cough is good   Secretion Mode: Able to clear   Secretion Amount: Large amount, moderately thick and clear in color   Respiratory Exam:: Breath sounds: diminished     Location: bilaterally   Skin Exam:: Patient color: natural   Patient Status: Currently appears comfortable   Venous Blood Gasses: pH: 7.49     pO2: 64     pCO2: 39     HOC3: 30         Recorded by Alyssa Chambers, RT

## 2022-10-25 NOTE — PROGRESS NOTES
"Otolaryngology Progress Note    S: Remains intubated and mechanically ventilated. Intermittently responds to voice though inconsistent.     O: /64   Pulse 54   Temp 97.7  F (36.5  C) (Axillary)   Resp 13   Ht 1.803 m (5' 10.98\")   Wt 111.1 kg (244 lb 14.9 oz)   SpO2 99%   BMI 34.18 kg/m    General: Resting comfortably, intubated and sedated.  HEENT: Neck soft without palpable masses.   Pulm: Intubated with 7.5 ETT, mechanically ventilated CMV/AC, 14 RR, 500 Vt, 50% FiO2, PEEP 5. Cuff leak present.    A/P: Zachary Gan is a 82 year old male with a past medical history of PE, DVT, PVD, venous stasis ulcers, HTN, and HLD presenting as a transfer from H after he was intubated in the setting of respiratory distress following a four day course of cough and sore throat. There was initial concern regarding airway compression from a thyroid nodule seen on CT scan but this was felt to be noncontributory to acute presentation. He was medically stable and appropriate for discharge for 10 days but was not discharged due to challenges with TCU/nursing home acceptance. Over the weekend he developed acute respiratory distress with flexible scope showing bilaterally hypomobile TVFs. He was intubated at that time and remains intubated and sedated.    Neuro: - Propofol drip, fentanyl PRN  - Sedation and pain control per SICU.  HEENT: - Decadron discontinued  - Will review previous scope exams and re-scope when extubated  CV: - HDS; PTA lipitor, apixaban, Lasix  Pulm: - Intubated, wean vent as tolerated.  - Pressure support trial today, extubation when able  Heme: - LOYDA, PTA apixiban  ID: - S/p Unasyn and Augmentin, no need for further abx.  Endo: - BG monitoring per SICU  GI: - NG in place, nutrition per SICU  - Electrolyte replacement PRN  - On PPI  MSK: - Lymphedema WOC following for venous stasis change to LE   Consults:  - WOC, SLP, Nutrition, SW, PT, OT, Lymphedema  Ppx: - SCDs, IS       Dispo: Pending " extubation and clinical improvement.    -- Patient and above plan to be discussed with MD Hiwot Britton MD  Otolaryngology-Head & Neck Surgery PGY-1  Please contact ENT with questions by dialing [phone] and entering job code [phone] when prompted.

## 2022-10-25 NOTE — PLAN OF CARE
Major Shift Events:  Arousing to voice, follows commands. Rass -3/-2 but will become agitated/restless occasionally. SB 50s (SICU aware) BP soft, SICU notified pts MAP drops <65 occasionally. No new orders placed, continue to monitor. Afebrile. CMV setting, tolerating. Lots of oral sections. OG to LIS. No BM. Nur In place with adequate UOP.      Plan: Try PS and possible extubation.   For vital signs and complete assessments, please see documentation flowsheets.

## 2022-10-25 NOTE — PROGRESS NOTES
"[facility]    ICU Progress Note       Date of Admission:  10/9/2022    Assessment: Critical Care   ASSESSMENT:  Zachary Gan is a 82 year old male with hx of DVT, PE on apixiban, PVD, HTN who was transferred from  to the SICU for airway compromise requiring intubation 2/2 midline vocal cords with minimal abduction on 10/9/2022. He was previously admitted to the SICU on 10/9 intubated after initially concern for thyroid nodule compression, however was deemed to have a suprglottic pathology at that time.     PLAN:     Neuro/ pain/ sedation:  - Pain: prn tylenol, prn oxy  - Sedation: off propofol gtt  - Monitor neurological status. Notify the MD/DO for any acute changes in exam.     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 There vocal cords were at the midline and there was almost no abduction of the cords on inhalation\"  - decadron 8mg q8h, stopped on 10/24     Pulmonary care:   # Intubation for airway protection 10/23  # Small L pleural effusion  # Hx of 11 mm soft tisue nodule in superficial left frontal lobe  # Hx PE on apixiban (11/6/2017)  - Previously treated for concern for epiglottis   - CMV vent setting  Vent Mode: CMV/AC  (Continuous Mandatory Ventilation/ Assist Control)  FiO2 (%): 50 %  Resp Rate (Set): 14 breaths/min  Tidal Volume (Set, mL): 500 mL  PEEP (cm H2O): 5 cmH2O  Resp: " 14  - PST x 2 attempted yesterday, failed to agitation/lots of secretions  - Daily CXR: Stable R>L bilateral pleural effusion, stable perihilar and bibasilar opacities  - PRN fluticasone  - VBG this AM, with respiratory alkalosis  - Passed SBT this AM, Plan extubation  - Supplemental oxygen to keep saturation above 92 %.  - Incentive spirometer every 15- 30 minutes when awake.     Cardiovascular:    # PVD, # HTN  - Monitor hemodynamic status. Continue PTA Lipitor.  - MAP >65    - off levophed     GI care/Nutrition:   - NPO except ice chips and medications.  - SLP eval  - OG to LIS      Fluids/ Electrolytes:  - LR @ 75ml/hr  - Hold lasix  - ICU electrolyte replacement protocol    Renal/ Fluid Balance:   - Nur in place  - Cr continues to uptrend, BUN/Cr 17 with likely intrarenal pathology  - Will continue to monitor intake and output.     Endocrine:    # DM2 controlled with diet  # Off Steroids  - Sliding scale insulin given steroids     ID/ Antibiotics:  - No indication for antibiotics.      Heme:     # Hx DVT of RUE unspecified time  # Chronic disease anemia   - Apixaban (okay to continue per ENT at this time)     MSK:  # Venous stasis dermatitis of B/LE  # Thoracic spondylosis without myelopathy  # age indeterminate compression fx t4  - WOC consulted, appreciate recs  - PT/OT consulted. Appreciate recs     General Cares/Prophylaxis:    DVT Prophylaxis: Eliquis, SCDs  GI Prophylaxis: PO PPI     Lines/ tubes/ drains:  - PIV, Midline, Nur, OG, and ETT     Disposition:  - Wean off of ventilatory support    Code Status: Full Code      The patient's care was discussed with the SICU fellow and attending on service.    Danny Kolb MD  [facility]  Securely message with the Vocera Web Console (learn more here)  Text page via [employer] Paging/Directory   ___________________________________________________________________    Interval History  Dexamethasone was stopped, Did not  tolerate PST.    Physical Exam   Vital Signs: Temp: 97.7  F (36.5  C) Temp src: Axillary BP: 98/57 Pulse: 65   Resp: 13 SpO2: 98 % O2 Device: Mechanical Ventilator    Weight: 244 lbs 14.9 oz  GENERAL: nad, obese  CARDIO: normal rate and regular rhythm   PULM: mechanical breath sounds  ABD: non-distended and soft  EXT: hyperpigmented LE b/l, no pitting edema  NEURO: sedated, opens eyes to voice, follows commands, good cough    Data   I reviewed all medications, new labs and imaging results over the last 24 hours.  Arterial Blood Gases   Recent Labs   Lab 10/23/22  2116   PH 7.50*   PCO2 41   PO2 77*   HCO3 32*     Complete Blood Count   Recent Labs   Lab 10/25/22  0346 10/24/22  0524 10/23/22  1534 10/19/22  0805   WBC 9.8 8.3 10.3 10.5   HGB 9.9* 11.2* 11.9* 11.5*    235 280 295     Basic Metabolic Panel  Recent Labs   Lab 10/25/22  0402 10/25/22  0346 10/24/22  2356 10/24/22  1942 10/24/22  0820 10/24/22  0524 10/23/22  1809 10/23/22  1534   NA  --  139  --   --   --  136  --  138   POTASSIUM  --  3.6  --   --   --  4.4  --  3.6   CHLORIDE  --  103  --   --   --  100  --  100   CO2  --  26  --   --   --  26  --  30*   BUN  --  23.1*  --   --   --  15.9  --  13.2   CR  --  1.33*  --   --   --  1.28*  --  1.22*   * 144* 139* 140*   < > 156*   < > 74    < > = values in this interval not displayed.     Liver Function Tests  Recent Labs   Lab 10/23/22  1534   AST 17   ALT 9*   ALKPHOS 101   BILITOTAL 0.5   ALBUMIN 3.3*   INR 1.45*     Pancreatic Enzymes  No lab results found in last 7 days.    Coagulation Profile  Recent Labs   Lab 10/23/22  1534   INR 1.45*   PTT 35

## 2022-10-25 NOTE — CARE PLAN
Major Shift Events:  Patient was successfully extubated this shift. ENT PA scoped patient after extubation, vocal cords appeared normal. Nur was removed and patient successfully voided via urinal. Eating status changed from NPO to soft/bite sized diet.     Plan: Has orders to transfer back to 6th floor when room becomes available.     For vital signs and complete assessments, please see documentation flowsheets.     CV: patient remained normal sinus rhythm, but at times becomes marvin into the 50's. MAP remained above 65, levo D/C'd.     Resp: Extubated. Patient remains on 3-5L NC with sats in the mid 90's. When off of O2 completely, sats drop into the 80's. Started on decadron per ENT.     Neuro: Patient alert and orientated. At times gets agitated easily. Frustrated he is still in the hospital.     GI: Diet updated to combination of soft/bite sized food with clear liquids. No insulin given this shift BS around 118.     : Nur removed. Urine output adequate since removing.

## 2022-10-25 NOTE — PROGRESS NOTES
"   10/25/22 9889   Appointment Info   Signing Clinician's Name / Credentials (SLP) Carlos Mercedes MA [facility] SLP   General Information   Onset of Illness/Injury or Date of Surgery 10/23/22   Referring Physician Danny Kolb MD   Patient/Family Therapy Goal Statement (SLP) none stated   Pertinent History of Current Problem   Per provider notes: \"Zachary Gan is a 82 year old male with a past medical history of PE, DVT, PVD, venous stasis ulcers, HTN, and HLD presenting as a transfer from H after he was intubated in the setting of respiratory distress following a four day course of cough and sore throat. There was initial concern regarding airway compression from a thyroid nodule seen on CT scan but this was felt to be noncontributory to acute presentation. He was medically stable and appropriate for discharge for 10 days but was not discharged due to challenges with TCU/nursing home acceptance. Over the weekend he developed acute respiratory distress with flexible scope showing bilaterally hypomobile TVFs. He was intubated at that time...\".     ENT endoscopy report completed this afternoon reveals: \"The epiglottis was sharp, and the visualized portion of the vallecula was clear. The larynx was clear with mobile cords bilaterally. There is notable abduction and adduction of the cords with phonation. There is a contact ulceration at the vocal process on the right cord, and symmetric area of swelling/fullness on the left vocal process. The arytenoids were clear with mild swelling that is unobstructive, and there was mild pooling of clear secretions in the piriforms.\"     Patient intubated 10/23 and extubated 10/25 AM. Swallow evaluation completed per provider orders this PM.     General Observations Patient sitting upright in bed, agitated that he was still in the hospital.   Type of Evaluation   Type of Evaluation Swallow Evaluation   Oral Motor   Oral Musculature generally intact   Dentition (Oral Motor) " "  Dentition (Oral Motor) dental appliance/dentures   Dental Appliance/Denture (Oral Motor) upper;poor fit   Facial Symmetry (Oral Motor)   Facial Symmetry (Oral Motor) WNL   Lip Function (Oral Motor)   Lip Range of Motion (Oral Motor) WNL   Lip Strength (Oral Motor) WNL   Tongue Function (Oral Motor)   Tongue Coordination/Speed (Oral Motor) WNL   Tongue ROM (Oral Motor) WNL   Jaw Function (Oral Motor)   Jaw Function (Oral Motor) WNL   Cough/Swallow/Gag Reflex (Oral Motor)   Soft Palate/Velum (Oral Motor) WNL   Volitional Throat Clear/Cough (Oral Motor) reduced strength   Volitional Swallow (Oral Motor) WNL   Vocal Quality/Secretion Management (Oral Motor)   Vocal Quality (Oral Motor) dysphonic   Secretion Management (Oral Motor) WNL   General Swallowing Observations   Past History of Dysphagia Patient was followed by SLP during initial portion of admission in current acute setting 10/10-10/13. Per SLP discharge summary 10/13: \"Recommend soft and bite-sized diet (IDDSI 6) and thin liquids (IDDSI 0). Unfortunately, pt does not have his dentures and has no way to obtain them here in the hospital. Pt has demonstrated he is unable to masticate very solid foods. Discussed with RN that pt is OK to have chicken noodle soup from the kitchenettes. No additional IP SLP services indicated.\"   Respiratory Support (General Swallowing Observations) nasal cannula   Current Diet/Method of Nutritional Intake (General Swallowing Observations, NIS) NPO   Swallowing Evaluation Clinical swallow evaluation   Clinical Swallow Evaluation   Feeding Assistance minimal assistance required   Clinical Swallow Evaluation Textures Trialed thin liquids;pureed;solid foods   Clinical Swallow Eval: Thin Liquid Texture Trial   Mode of Presentation, Thin Liquids spoon;fed by clinician;cup;self-fed   Volume of Liquid or Food Presented >4 oz thin water   Oral Phase of Swallow WFL   Pharyngeal Phase of Swallow intact   Diagnostic Statement No overt s/s of " aspiration across all trials.   Clinical Swallow Evaluation: Puree Solid Texture Trial   Mode of Presentation, Puree spoon;fed by clinician   Volume of Puree Presented 4 tsps   Oral Phase, Puree WFL   Pharyngeal Phase, Puree intact   Diagnostic Statement No overt s/s of aspiration. Adequate clearance.   Clinical Swallow Evaluation: Solid Food Texture Trial   Mode of Presentation self-fed   Volume Presented 1 bite madelyn cracker   Oral Phase impaired mastication;other (see comments)  (poor fitting upper dentures)   Pharyngeal Phase intact   Diagnostic Statement Impaired mastication of hard solids d/t poor fitting upper dentures. Patient required extended mastication episode, with poor breakdown.   Esophageal Phase of Swallow   Patient reports or presents with symptoms of esophageal dysphagia No   Swallowing Recommendations   Diet Consistency Recommendations soft & bite-sized (level 6);thin liquids (level 0)   Supervision Level for Intake close supervision needed   Mode of Delivery Recommendations bolus size, small;slow rate of intake   Swallowing Maneuver Recommendations alternate food and liquid intake   Monitoring/Assistance Required (Eating/Swallowing) stop eating activities when fatigue is present;monitor for cough or change in vocal quality with intake   Recommended Feeding/Eating Techniques (Swallow Eval) maintain upright sitting position for eating;provide assist with feeding   Medication Administration Recommendations, Swallowing (SLP) As tolerated   Instrumental Assessment Recommendations instrumental evaluation not recommended at this time   General Therapy Interventions   Planned Therapy Interventions Dysphagia Treatment   Dysphagia treatment Modified diet education;Instruction of safe swallow strategies   Clinical Impression   Criteria for Skilled Therapeutic Interventions Met (SLP Eval) Yes, treatment indicated   SLP Diagnosis Mild oral dysphagia   Risks & Benefits of therapy have been explained  evaluation/treatment results reviewed;care plan/treatment goals reviewed;risks/benefits reviewed;current/potential barriers reviewed;participants voiced agreement with care plan;participants included;patient   Clinical Impression Comments   Clinical swallow evaluation completed per provider orders. Patient did participate, but was agitated throughout evaluation. Patient demonstrates mild oral dysphagia c/b poor fitting upper dentures and otherwise edentulous state leaving patient unable to masticate hard solid textures. Notable dysphonic vocal quality, but note ENT endoscopy note for details regarding vocal folds function. Patient completed trials of thin liquids via tsp and single cup sips without overt s/s of aspiration. Trials of puree textures and hard solids revealed adequate bolus acceptance and closure, significantly extended and impaired mastication episode of hard solids with poor fitting upper dentures, but overall good oral clearance. Patient reports he only eats regular textures with dentures, but as of now are too loose.     At this time, recommend initiate soft & bite size texture solids and thin liquids diet. Patient should have assistance with feeding d/t generalized weakness. Safe swallow strategies include taking small bites/sips, slow rate of intake, alternate liquids and solids, and stop eating when feeling fatigued. Potential for denture adhesive to improve holding fit for attempting regular texture solids, but when asked about it, patient not receptive. Re-attempt education about dental adhesive with the patient. SLP will continue to follow regarding dysphagia management.     SLP Total Evaluation Time   Eval: oral/pharyngeal swallow function, clinical swallow Minutes (46181) 14   SLP Discharge Planning   SLP Plan Re-attempt to educate patient on dental adhesive since he has upper dentures now, but they do not fit (he was agitated and not receptive to trialing when education provided during  eval). Diet tolerance, safe swallow strategy use.   SLP Discharge Recommendation Long term care facility;home with home care speech therapy   SLP Rationale for DC Rec Below baseline swallow function, but improving. May not need SLP services at time of d/c.   SLP Brief overview of current status  Recommend initiate soft & bite size texture solids and thin liquids diet. Patient should have assistance with feeding d/t generalized weakness. Safe swallow strategies include taking small bites/sips, slow rate of intake, alternate liquids and solids, and stop eating when feeling fatigued. Potential for denture adhesive to improve holding fit for attempting regular texture solids, but when asked about it, patient not receptive. Re-attempt education about dental adhesive with the patient. SLP will continue to follow regarding dysphagia management.

## 2022-10-26 NOTE — PLAN OF CARE
Major Shift Events:  Alert and oriented x4. Following commands. Denies pain. SR/SB 50s-70s. BP stable. 2L NC, lungs course. Complains of SOB with turns. Voiding in bedside urinal, no BM. BLE wound dressing changed.   Plan: Transfer to  when bed is available.   For vital signs and complete assessments, please see documentation flowsheets.

## 2022-10-26 NOTE — PROGRESS NOTES
"Otolaryngology Progress Note    S: Minimal O2 overnight.    O: BP 95/52   Pulse 52   Temp 98  F (36.7  C) (Axillary)   Resp 23   Ht 1.803 m (5' 10.98\")   Wt 111.1 kg (244 lb 14.9 oz)   SpO2 97%   BMI 34.18 kg/m    General: Resting comfortably  HEENT: Neck soft without palpable masses.   Pulm: Resting comfortably, no stridor, voice is squeaky    A/P: [place] Malik is a 82 year old male with a past medical history of PE, DVT, PVD, venous stasis ulcers, HTN, and HLD presenting as a transfer from OSH after he was intubated in the setting of respiratory distress following a four day course of cough and sore throat. There was initial concern regarding airway compression from a thyroid nodule seen on CT scan but this was felt to be noncontributory to acute presentation. He was medically stable and appropriate for discharge for 10 days but was not discharged due to challenges with TCU/nursing home acceptance. Over the weekend he developed acute respiratory distress with flexible scope showing bilaterally hypomobile TVFs. He was intubated at that time and extubated 10/25. No episodes since and flex laryngoscopy showed mobile cords 10/24.    Neuro: - Propofol drip, fentanyl PRN  - Sedation and pain control per SICU.  HEENT: - Decadron x3  - Will review previous scope exams and re-scope when extubated  -thyroid US and FNA ordered to r/o RLN paresis related to malignancy  CV: - HDS; PTA lipitor, apixaban, Lasix  Pulm: - wean O2  Heme: - LOYDA, PTA apixiban  ID: - S/p Unasyn and Augmentin, no need for further abx.  Endo: - ISS  GI: - diet per SLP  - Electrolyte replacement PRN  - On PPI  MSK: - Lymphedema WOC following for venous stasis change to LE   Consults:  - WOC, SLP, Nutrition, SW, PT, OT, Lymphedema  Ppx: - SCDs, IS       Dispo: Pending extubation and clinical improvement.    -- Patient and above plan to be discussed with MD Zachary Caceres MD      "

## 2022-10-26 NOTE — PLAN OF CARE
Neuro: A&Ox4. +4 strength in all extremities. PERRLA. Ax1 w/ GB and walker.  Cardiac: SB-SR w/ frequent PVCs. HR between low 50s-mid 80s. SBP WNL. Tmax 98.8  Respiratory: LS coarse. Hoarse voice. Weaned to RA since 1200.  GI: Passing flatus, no BM for a couple days, per pt, he feels he will have one this evening. Advanced from level 6 diet to regular diet this afternoon. Tolerating well.  : Voiding spontaneously w/o difficulty. Home lasix dose of 20 mg given this AM  LDAs: PIVx1, R midline double lumen cath  Integ: BLE wounds, mercedez BLE, generalized bruising/abrasians  Tests/Procedures: Laryngoscopy by ENT, thyroid US  Misc: Ambulated in carbajal w/ PT and OT this shift. Up in chair for lunch and dinner.    For vital signs and complete assessments, please see documentation flowsheets    Plan: Transfer to  once finished eating dinner    Problem: Wound Healing Progression  Goal: Optimal Wound Healing  Intervention: Promote Wound Healing  Recent Flowsheet Documentation  Taken 10/26/2022 1400 by Itzel Sequeira, RN  Activity Management:    ambulated in room    up in chair  Taken 10/26/2022 1200 by Itzel Sequeira, RN  Activity Management: activity adjusted per tolerance  Taken 10/26/2022 1000 by Itzel Sequeira, RN  Activity Management: (jail past (closest) nurses station and then back to room)    ambulated in room    ambulated outside room    up in chair  Taken 10/26/2022 0800 by Itzel Sequeira, RN  Activity Management: activity adjusted per tolerance     Goal Outcome Evaluation: Plan reviewed with patient and patients sister (Deborah)

## 2022-10-26 NOTE — PROGRESS NOTES
Transferred to: Unit 6B at 1830 via wheelchair  Belongings: shoes, clothes, upper dentures, glasses  Nur removed? Yes  Central line removed? Yes  Chart and medications sent with patient Yes  Family notified: Yes

## 2022-10-26 NOTE — PROGRESS NOTES
Transfer  Transferred from: 4A  Via: wheelchair   Reason for transfer: Pt appropriate for 6B- improved patient condition  Family: Aware of transfer  Belongings: Received with pt  Chart: Received with pt  Medications: Meds received from old unit with pt  Code Status verified on armband: yes  2 RN Skin Assessment Completed By: Jayda  Med rec completed: yes  Bed surface reassessed with algorithm and charted: no  New bed surface ordered: no  Suction/Ambu bag/Flowmeter at bedside: yes    Report received from:  Itzel   Pt status: Alert and stable

## 2022-10-26 NOTE — CONSULTS
"    Interventional Radiology Consult Service Note    IR consulted for possible thyroid goiter FNA    This is a 82 year old male with a past medical history of PE, DVT, PVD, venous stasis ulcers, HTN, and HLD presenting as a transfer from OSH after he was intubated in the setting of respiratory distress following a four day course of cough and sore throat. There was initial concern regarding airway compression from a thyroid nodule seen on CT scan but this was felt to be noncontributory to acute presentation. He was medically stable and appropriate for discharge for 10 days but was not discharged due to challenges with TCU/nursing home acceptance. Over the weekend he developed acute respiratory distress with flexible scope showing bilaterally hypomobile TVFs. He was intubated at that time and extubated 10/25. No episodes since and flex laryngoscopy showed mobile cords 10/24. IR is consulted for biopsy of goiter to rule out malignancy given RLN paresis.     Case and imaging was reviewed with Dr. Flores from IR. CT of neck 10/23 notes a multinodular goiter. IR would not offer a biopsy of a goiter. Recommend US thyroid to evaluate for concerning thyroid nodule that may require sampling and that could be specifically targeted by IR provider.     Recommendations were reviewed with Dr. Forman. IR will follow-up based on US results. US ordered by ENT.    Vitals:   /55   Pulse 57   Temp 98.4  F (36.9  C) (Oral)   Resp 26   Ht 1.803 m (5' 10.98\")   Wt 111.1 kg (244 lb 14.9 oz)   SpO2 95%   BMI 34.18 kg/m      Pertinent Labs:     Lab Results   Component Value Date    WBC 9.8 10/25/2022    WBC 8.3 10/24/2022    WBC 10.3 10/23/2022       Lab Results   Component Value Date    HGB 9.9 10/25/2022    HGB 11.2 10/24/2022    HGB 11.9 10/23/2022       Lab Results   Component Value Date     10/25/2022     10/24/2022     10/23/2022       Lab Results   Component Value Date    INR 1.45 (H) 10/23/2022 "    PTT 35 10/23/2022       Lab Results   Component Value Date    POTASSIUM 4.0 10/26/2022        MELAINE Martin CNP  Interventional Radiology   Pager: [phone]

## 2022-10-26 NOTE — OP NOTE
Flexible laryngoscopy (CPT 92986)      Pre-procedure diagnosis: dysphonia  Post-procedure diagnosis: same as above  Indication for procedure: Mr. Gan is a 82 year old male with see above  Procedure(s): Fiberoptic Laryngoscopy    Details of Procedure: After informed consent was obtained, the patient was seated in the examination chair.  The areas of the nasopharynx as well as the hypopharynx were anesthetized with topical 4% lidocaine with 0.25% phenylephrine atomizer.  Examination of the base of tongue was performed first.  Attention was directed to any evidence of masses in the area or evidence of leukoplakia or candidal infection.  Attention was directed to the epiglottis where its size and position was determined and its movement on phonation of the vowel  e .  The piriform sinuses were then inspected for any mass lesions or pooling of secretions.  Attention was then directed to the larynx. The vocal folds were inspected for infection or any areas of leukoplakia, for masses, polypoid degeneration, or hemorrhage.  Having done this, the arytenoids and vocal processes were inspected for erythema or evidence of granuloma formation.  The posterior commissure was then inspected for evidence of inflammatory changes in the mucosa and heaping up of mucosal tissue. The patient was then instructed to say the vowel  e .  Adduction of vocal folds to the midline was observed for any evidence of paresis or paralysis of the larynx or asymmetry in rotation of the larynx to the left or right. The patient was asked to breathe and the degree of abduction was noted bilaterally.  Subglottic view of the larynx was obtained for any additional mass lesions or mucosal changes.  Finally the post cricoid was examined for evidence of pooling of secretions, as well as the pharyngeal wall mucosa.   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 Discussed breathing techniques. Work up thyroid growth.

## 2022-10-26 NOTE — PROGRESS NOTES
Care Management Follow Up    Length of Stay (days): 17    Expected Discharge Date:  10/28/22     Concerns to be Addressed: discharge planning     Patient plan of care discussed at interdisciplinary rounds: Yes    Anticipated Discharge Disposition:  DEEPAK     Anticipated Discharge Services:  Resumption of home care (RN,PT,OT and HHA)  Anticipated Discharge DME:  None identified at this time    Additional Information:  Pt transferred to ICU on 10/23 due to respiratory distress and intubated.  Pt extubated yesterday, 10/25.    Per chart review and discussion with ENT team, pt was getting ready to be discharged back to his DEEPAK prior transferring to ICU.  The team stated pt is almost back to his base line and might be ready by tomorrow, 10/27 to be discharge to his DEEPAK,  if accepted.  RNCC contacted pt [facility] # [phone] and spoke to Leeanne the .  Leeanne stated they need to reassess pt to make sure he is back to his base line and ind, with the care that he needs to perform.  Leeanne stated they were providing shower 1X/week, 3 meals /day, Am care, and house keeping.  Pt was managing his own daily wound care.  Leeanne requested update PT/OT and RN notes be faxed to her.  RNCC faxed OT and RN notes and vital signes flow sheet to fax # [phone].  RNCC agreed to f/u with Leeanne tomorrow morning.  RNCC contact number provided to Leeanne.    please see floor RNCC note on 10/20/22 for the detail of discharge planning with the [facility].    [facility] - open for RN/PT/OT, SALTYA added  Ph: [phone]  Fax: [phone]     [facility]  [address] [place] [address]  Contact: Leeanne  Ph: [phone]  Discharge pharmacy: [facility] in [place] (ph: [phone])  Discharge fax#: [phone] (therapy notes, RN notes, discharge order & summary)  RN to RN report: [phone]       Camilo Reyes, RN, PHN, BSN  4A and 4E/ ICU  Care Coordinator  Phone: [phone]  Pager:  [phone]    To contact the weekend [facility] (0800 - 1630) Saturday and Sunday   Units: 4A, 4C, 4E, 5A and 5B- Pager 1: [phone]   Units: 6A, 6B, 6C, 6D- Pager 2: [phone]   Units: 7A, 7B, 7C, 7D, and 5C-Pager 3: [phone]

## 2022-10-27 NOTE — PROGRESS NOTES
Care Management Follow Up    Length of Stay (days): 18    Expected Discharge Date: 10/27     Concerns to be Addressed: discharge planning     Patient plan of care discussed at interdisciplinary rounds: Yes    Anticipated Discharge Disposition: halfway  Anticipated Discharge Services: Home care  Anticipated Discharge DME:  No new DME noted.     Education Provided on the Discharge Plan: Yes  Patient/Family in Agreement with the Plan: Yes    Referrals Placed by CM/SW: No new referrals at this time.   Private pay costs discussed: Not applicable    Additional Information:  Patient is medically ready for discharge today. Writer contacted patient's DEEPAK, spoke w/EZIO Fallon who confirmed that patient sounds like he is near or at his baseline w/mobility, she needs to discuss w/the director before confirming discharge. Primary team updated. Will f/u with patient, his sister and the primary team once discharge is confirmed.     6316 Addendum:   Confirmed w/the DEEPAK, patient can return today. Patient's sister will be providing transportation, will be at the hospital at 2p. Patient bedside nurse updated. Discharge orders faxed to halfway and  at this time. IMM completed, patient agreeable to discharge.     [facility] - open for RN/PT/OT, HHA added  Ph: [phone]  Fax: [phone]     [facility]  [address] [place] [address]  Contact: Leeanne  Ph: [phone]  Discharge pharmacy: [facility] in [place] (ph: [phone])  Discharge fax#: [phone] (therapy notes, RN notes, discharge order & summary)  RN to RN report: [phone]    Christy Glynn RNCC, BSN    [facility]    [address]  [address] [place] [address]    [email or web address]    Office: [phone] Pager: [phone]    To contact the weekend RNTHERESA  [place] (0800 - 1630) Saturday and Sunday    Units: 4A, 4C, 4E, 5A and 5B- Pager 1: [phone]    Units: 6A, 6B, 6C, 6D- Pager  2: [phone]    Units: 7A, 7B, 7C, 7D, and 5C-Pager 3: [phone]     complains of pain/discomfort

## 2022-10-27 NOTE — PLAN OF CARE
Neuro: A&Ox4. No new neuro deficit   Cardiac: SB. HR 40-50s VSS.   Respiratory: Sating > 95% on 2 LPM NC   GI/: Adequate urine output. Uses the urinal. No BM thi shift   Diet/appetite: Tolerating reg diet. Eating well.  Activity:  Assist of one with walker and gait belt   Pain: At acceptable level on current regimen. Denies pain   Skin: No new deficits noted.  LDA's: R Double Lumen midline     Plan: Continue with POC. Notify primary team with changes.   Goal Outcome Evaluation:

## 2022-10-27 NOTE — PROGRESS NOTES
"Otolaryngology Progress Note    S: No acute events overnight     O: /87 (BP Location: Left arm)   Pulse 52   Temp 98  F (36.7  C) (Oral)   Resp 18   Ht 1.803 m (5' 10.98\")   Wt 110.7 kg (244 lb 0.8 oz)   SpO2 97%   BMI 34.05 kg/m    General: Resting comfortably  HEENT: Neck soft without palpable masses.   Pulm: Resting comfortably, no stridor, voice is squeaky    Thyroid U/S 10/26/22  Technically limited evaluation of the thyroid demonstrates an enlarged  heterogeneous thyroid. There is a  2.5 cm TR4 nodule in the left lobe  and a 4.6 cm TR4 nodule in the right lobe. Per ACR TI-RADS  recommendations, tissue sampling with FNA should be considered.      A/P: Zachary Gan is a 82 year old male with a past medical history of PE, DVT, PVD, venous stasis ulcers, HTN, and HLD presenting as a transfer from OSH after he was intubated in the setting of respiratory distress following a four day course of cough and sore throat. There was initial concern regarding airway compression from a thyroid nodule seen on CT scan but this was felt to be noncontributory to acute presentation. He was medically stable and appropriate for discharge for 10 days but was not discharged due to challenges with TCU/nursing home acceptance. Over the weekend he developed acute respiratory distress with flexible scope showing bilaterally hypomobile TVFs. He was intubated at that time and extubated 10/25. No episodes since and flex laryngoscopy showed mobile but paretic cords with paradoxcial vocal fold motion.    Neuro: - PRN pain control   HEENT:   - s/p decadron x3  - thyroid FNA with IR  CV: - HDS; PTA lipitor, apixaban, Lasix  Pulm: - wean O2  Heme: - LOYDA, PTA apixiban  ID: - S/p Unasyn and Augmentin, no need for further abx.  Endo: - sliding scale insulin   GI: - diet per SLP  - Electrolyte replacement PRN  - On PPI  MSK: - Lymphedema WOC following for venous stasis change to LE   Consults:  - WOC, SLP, Nutrition, SW, PT, OT, " Lymphedema  Ppx: - SCDs, IS       Dispo: Pending thyroid workup, demonstration of independence with cares    -- Patient and above plan to be discussed with MD Dorothea Caceres MD

## 2022-10-27 NOTE — PLAN OF CARE
Physical Therapy Discharge Summary    Reason for therapy discharge:    Discharged to [facility] with home therapy    Progress towards therapy goal(s). See goals on Care Plan in [facility] electronic health record for goal details.  Goals met/adequate for discharge    Therapy recommendation(s):    Continued therapy is recommended.  Rationale/Recommendations:  will benefit from HH therapies to maximize strength, activity tolerance and functional independence.

## 2022-10-27 NOTE — PLAN OF CARE
"Goal Outcome Evaluation:    Neuro: A&Ox4. [place].   Cardiac: SR with PAC, freq PVC. Fabiano at times-discussed with PA at 1150, no changes to plan per ENT MD.  Respiratory: Sating 94% on RA. 2L at HS.   GI/: Adequate urine output. Voiding in urinal. LBM 10/23. Refused stool softener this AM.  Diet/appetite: Tolerating regular diet.  Activity:  Assist of 1 and a walker/gait belt, up to chair and in halls.  Pain: Denies.   Skin: No new deficits noted. Wound care to BLE done per orders.  LDA's: R midline SL x2. L PIV SL.   Plan: Continue with POC. Pt refused inhaler this AM- education provided to pt.     1150- paged PA. Called by telemetry tech due to O2 sats at 77%. Entered room and pt asleep at 77%. Awoke pt easily, no SOB noted or reported per pt. Remains at 77% upon awakening. Fingertips cool, changed spo2 probe x2 and applied 5L NC. Upon review from 1966-4665 sats in 70's with low at 65%. Pt  Asymptomatic entire duration. LS clear with diminished bases on L side. Returned to 100% o2 sats with o2 probe on R ear. Weaned to RA at 94%. Pt with cool fingertips/poor perfusion and freq PVC's on monitor, 50's-60's. Per telemetry tele in 80's during low O2 sat episode. No respiratory distress noted or SOB complaints from patient during episode. Spoke with PA on phone- no interventions per team.     1220- pt monitored on RA and maintaining sats >90's, denies complaints. Does report his \"left gland\" is sore under jawline, when he swallows he notices it is sore. States he believes it is from people \"pressing on it a lot yesterday\". Notified Eloina BOUCHER- provider aware and no changes to plan of care and proceed with discharge.    1400-monitored spo2 until 1400- no desats on RA,  No respiratory distress or complaints of SOB. Pt eager to discharge.    DISCHARGE                         No discharge date for patient encounter.  ----------------------------------------------------------------------------  Discharged to: SNF  Via: " private transportation   Accompanied by: Family - sister Deborah and wife.   Discharge Instructions: regular diet, thin liquids, medications, follow up appointments, when to call the MD, aftercare instructions.  Prescriptions: [facility]; medication list reviewed & sent with pt. 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 Pt verbalized understanding.   Follow Up Appointments: arranged; information given- follow up with ENT 11/3 + speech therapy visit.  Belongings: All sent with pt  IV: d/c'd midline and PIV.   Telemetry: d/c'd  Pt exhibits understanding of above discharge instructions; all questions answered.  Discharge Paperwork: Signed, copied, and sent home with patient. Reviewed with sister Deborah. Report called to RN at receiving facility.

## 2022-10-27 NOTE — PLAN OF CARE
Neuro: A&Ox4.   Cardiac: SR/SB. VSS.    Respiratory: Sating 97 on RA.  GI/: Adequate urine output. BM 10/23  Diet/appetite: Tolerating Reg diet. Eating well.  Activity:  Assist of 1 w/gait and walker, up to chair and in halls.  Pain: At acceptable level on current regimen.   Skin: No new deficits noted. BLE wraps on, WOC daily. Bruising on hands  LDA's: Double lumen midline, L PIV    Plan: Continue with POC. Notify primary team with changes.

## 2022-10-27 NOTE — PROVIDER NOTIFICATION
Time of notification: 9:55 PM  Provider notified:IVY RAMIREZ   Patient status: FYI: tele notified me around 2055 that the pt had some PAC. Pt has no cardiac symptoms. HR currently 53.   Orders received: no orders

## 2022-10-27 NOTE — PLAN OF CARE
Speech Language Therapy Discharge Summary    Reason for therapy discharge:    Discharged to [facility]    Progress towards therapy goal(s). See goals on Care Plan in [facility] electronic health record for goal details.  Goals met    Therapy recommendation(s):    No further therapy is recommended. At time of discharge, SLP recommended the pt advance to regular diet/thin liquids with pt self-selecting softer solids per his preference. Ensure the pt is upright and alert for all PO, wearing upper denture.

## 2022-10-28 NOTE — PROGRESS NOTES
[facility]    Background: Transitional Care Management program identified per system criteria and reviewed by [facility] team for possible outreach.    Assessment: Upon chart review, CCR Team member will not proceed with patient outreach related to this episode of Transitional Care Management program due to reason below:    Patient is not established within [facility] Primary Care and CCRC team member noted patient discharged to TCU/ARU/LTACH.    Plan: Transitional Care Management episode addressed appropriately per reason noted above.      RENETTA Suh  [facility] [place] [facility], [facility]    *Connected Care Resource Team does NOT follow patient ongoing. Referrals are identified based on internal discharge reports and the outreach is to ensure patient has an understanding of their discharge instructions.

## 2022-10-28 NOTE — PLAN OF CARE
Occupational Therapy Discharge Summary    Reason for therapy discharge:    Discharged to home with home therapy.(DEEPAK)    Progress towards therapy goal(s). See goals on Care Plan in [facility] electronic health record for goal details.  Goals partially met.  Barriers to achieving goals:   discharge from facility.    Therapy recommendation(s):    Continued therapy is recommended.  Rationale/Recommendations:  HH therapies to progress functional IND and safety.

## 2022-11-03 NOTE — TELEPHONE ENCOUNTER
Writer tried calling patient to reschedule since he was a no show at his appointment. Current mobile number listed is not the correct number.